# Patient Record
Sex: MALE | Employment: UNEMPLOYED | URBAN - METROPOLITAN AREA
[De-identification: names, ages, dates, MRNs, and addresses within clinical notes are randomized per-mention and may not be internally consistent; named-entity substitution may affect disease eponyms.]

---

## 2021-12-13 ENCOUNTER — HOSPITAL ENCOUNTER (OUTPATIENT)
Age: 46
Discharge: HOME | End: 2021-12-13
Payer: COMMERCIAL

## 2021-12-13 VITALS
HEART RATE: 75 BPM | TEMPERATURE: 98 F | BODY MASS INDEX: 45.1 KG/M2 | SYSTOLIC BLOOD PRESSURE: 143 MMHG | HEIGHT: 70 IN | WEIGHT: 315 LBS | OXYGEN SATURATION: 96 % | DIASTOLIC BLOOD PRESSURE: 84 MMHG | RESPIRATION RATE: 22 BRPM

## 2021-12-13 DIAGNOSIS — U07.1 COVID-19: Primary | ICD-10-CM

## 2021-12-13 PROCEDURE — 99204 OFFICE O/P NEW MOD 45 MIN: CPT

## 2021-12-13 PROCEDURE — 99203 OFFICE O/P NEW LOW 30 MIN: CPT

## 2021-12-13 RX ORDER — BUPRENORPHINE AND NALOXONE 8; 2 MG/1; MG/1
1 FILM, SOLUBLE BUCCAL; SUBLINGUAL 2 TIMES DAILY
COMMUNITY

## 2021-12-13 RX ORDER — ACETAMINOPHEN 500 MG
1000 TABLET ORAL ONCE
Status: COMPLETED | OUTPATIENT
Start: 2021-12-13 | End: 2021-12-13

## 2021-12-13 NOTE — ED INITIAL ASSESSMENT (HPI)
Pt exposed to covid positive people at a  1+ weeks ago. Co cough, sinus congestion, HA, fever and fatigue approx 1 week. Noted covid positive today.   Pt co SOB at rest and more with exertion

## 2021-12-13 NOTE — ED PROVIDER NOTES
Patient Seen in: [facility]      History   Patient presents with:  Difficulty Breathing    Stated Complaint: shortness breathe difficult breathing     Subjective:   HPI    80-year-old male who comes in today after testing positive for COV Regular rate and rhythm, no murmurs      ED Course   Labs Reviewed - No data to display                Reviewed patient's positive test from 12/13/2021    MDM      31-year-old male who is fully vaccinated with obesity comes in today for clinical antibodies the results and when to return if worse. The patient/caregiver verbalized understanding of the instructions.            Medications Prescribed:  Current Discharge Medication List

## 2021-12-14 NOTE — ED QUICK NOTES
Patient arrived for monoclonal antibody infusion. PIV started and Regeneron administered according to protocol. Call light within reach. VS rechecked per protocol prior to infusion and upon completion.   Patient tolerated infusion well, no adverse reactio

## 2023-02-10 ENCOUNTER — OFFICE VISIT (OUTPATIENT)
Dept: INTERNAL MEDICINE CLINIC | Facility: CLINIC | Age: 48
End: 2023-02-10
Payer: COMMERCIAL

## 2023-02-10 VITALS
WEIGHT: 315 LBS | HEART RATE: 68 BPM | SYSTOLIC BLOOD PRESSURE: 114 MMHG | DIASTOLIC BLOOD PRESSURE: 82 MMHG | BODY MASS INDEX: 50.03 KG/M2 | HEIGHT: 66.5 IN | OXYGEN SATURATION: 97 %

## 2023-02-10 DIAGNOSIS — Z51.81 ENCOUNTER FOR THERAPEUTIC DRUG LEVEL MONITORING: Primary | ICD-10-CM

## 2023-02-10 DIAGNOSIS — E66.01 CLASS 3 SEVERE OBESITY WITH BODY MASS INDEX (BMI) OF 50.0 TO 59.9 IN ADULT, UNSPECIFIED OBESITY TYPE, UNSPECIFIED WHETHER SERIOUS COMORBIDITY PRESENT (HCC): ICD-10-CM

## 2023-02-10 DIAGNOSIS — E78.2 MIXED HYPERLIPIDEMIA: ICD-10-CM

## 2023-02-10 DIAGNOSIS — Z98.84 S/P BARIATRIC SURGERY: ICD-10-CM

## 2023-02-10 DIAGNOSIS — E55.9 VITAMIN D DEFICIENCY: ICD-10-CM

## 2023-02-10 RX ORDER — MELATONIN
325
COMMUNITY

## 2023-02-10 RX ORDER — CHOLECALCIFEROL (VITAMIN D3) 1250 MCG
CAPSULE ORAL
COMMUNITY

## 2023-02-13 ENCOUNTER — TELEPHONE (OUTPATIENT)
Dept: INTERNAL MEDICINE CLINIC | Facility: CLINIC | Age: 48
End: 2023-02-13

## 2023-02-13 NOTE — TELEPHONE ENCOUNTER
[facility]   [id number]    S/P bariatric surgery  Z98.84    Class 3 severe obesity with body mass index (BMI) of 50.0 to 59.9 in adult, unspecified obesity type, unspecified whether serious comorbidity present ([facility]  E66.01, Z68.43

## 2023-02-21 NOTE — TELEPHONE ENCOUNTER
I called [facility] to check on status of PA  Spoke with Coty Ohm is a plan denial as it is not covered under pharmacy benefits. How do you want to proceed?

## 2023-02-22 RX ORDER — SEMAGLUTIDE 1.34 MG/ML
0.5 INJECTION, SOLUTION SUBCUTANEOUS
Qty: 1.5 ML | Refills: 0 | Status: SHIPPED | OUTPATIENT
Start: 2023-02-22

## 2023-08-01 NOTE — TELEPHONE ENCOUNTER
Pt called today and set up an appt with Jean Pierre Hernandez for her first avaiable appt on 10/26/23 [facility] Pt req a refill up until his future appt date.

## 2023-08-03 NOTE — TELEPHONE ENCOUNTER
Requesting   Requested Prescriptions     Pending Prescriptions Disp Refills    semaglutide (OZEMPIC, 0.25 OR 0.5 MG/DOSE,) 2 MG/3ML Subcutaneous Solution Pen-injector 3 mL 1     Sig: Inject 0.5 mg into the skin once a week. LOV: 2/10/23  RTC:   Last Relevant Labs:   Filled: 2/22/23 #1. 5mL with 0 refills    Future Appointments   Date Time Provider Royer Ibarra   10/26/2023 10:40 AM Sid Arambula PA-C [address] [facility] [address]

## 2023-08-06 RX ORDER — SEMAGLUTIDE 0.68 MG/ML
0.5 INJECTION, SOLUTION SUBCUTANEOUS WEEKLY
Qty: 3 ML | Refills: 1 | Status: SHIPPED | OUTPATIENT
Start: 2023-08-06

## 2023-09-05 ENCOUNTER — TELEPHONE (OUTPATIENT)
Dept: ORTHOPEDICS CLINIC | Facility: CLINIC | Age: 48
End: 2023-09-05

## 2023-09-05 DIAGNOSIS — Z01.89 ENCOUNTER FOR LOWER EXTREMITY COMPARISON IMAGING STUDY: Primary | ICD-10-CM

## 2023-09-05 DIAGNOSIS — M25.561 RIGHT KNEE PAIN, UNSPECIFIED CHRONICITY: ICD-10-CM

## 2023-09-05 NOTE — TELEPHONE ENCOUNTER
Future Appointments   Date Time Provider Royer Ibarra   9/20/2023  8:00 AM Bradley Gill MD [id number]       This patient is coming for RT Knee severe pain. No prior imaging done yet. Please advise if views are needed for this appt. Thanks.       Patient may be reached at [phone]

## 2023-09-20 ENCOUNTER — HOSPITAL ENCOUNTER (OUTPATIENT)
Dept: GENERAL RADIOLOGY | Age: 48
Discharge: HOME | End: 2023-09-20
Attending: ORTHOPAEDIC SURGERY
Payer: COMMERCIAL

## 2023-09-20 DIAGNOSIS — Z01.89 ENCOUNTER FOR LOWER EXTREMITY COMPARISON IMAGING STUDY: ICD-10-CM

## 2023-09-20 DIAGNOSIS — M25.561 RIGHT KNEE PAIN, UNSPECIFIED CHRONICITY: ICD-10-CM

## 2023-09-26 ENCOUNTER — HOSPITAL ENCOUNTER (OUTPATIENT)
Dept: GENERAL RADIOLOGY | Age: 48
Discharge: HOME | End: 2023-09-26
Attending: ORTHOPAEDIC SURGERY
Payer: COMMERCIAL

## 2023-09-26 PROCEDURE — 73562 X-RAY EXAM OF KNEE 3: CPT | Performed by: ORTHOPAEDIC SURGERY

## 2023-09-26 PROCEDURE — 73564 X-RAY EXAM KNEE 4 OR MORE: CPT | Performed by: ORTHOPAEDIC SURGERY

## 2023-09-29 ENCOUNTER — OFFICE VISIT (OUTPATIENT)
Dept: ORTHOPEDICS CLINIC | Facility: CLINIC | Age: 48
End: 2023-09-29
Payer: COMMERCIAL

## 2023-09-29 VITALS — BODY MASS INDEX: 46.65 KG/M2 | HEIGHT: 69 IN | WEIGHT: 315 LBS

## 2023-09-29 DIAGNOSIS — S83.206A POSITIVE MCMURRAY TEST OF RIGHT KNEE, INITIAL ENCOUNTER: Primary | ICD-10-CM

## 2023-09-29 DIAGNOSIS — S83.91XA SPRAIN OF RIGHT KNEE, UNSPECIFIED LIGAMENT, INITIAL ENCOUNTER: ICD-10-CM

## 2023-09-29 DIAGNOSIS — M25.561 RIGHT KNEE PAIN, UNSPECIFIED CHRONICITY: ICD-10-CM

## 2023-09-29 RX ORDER — KETOROLAC TROMETHAMINE 30 MG/ML
30 INJECTION, SOLUTION INTRAMUSCULAR; INTRAVENOUS ONCE
Status: COMPLETED | OUTPATIENT
Start: 2023-09-29 | End: 2023-09-29

## 2023-09-29 RX ORDER — TRIAMCINOLONE ACETONIDE 40 MG/ML
40 INJECTION, SUSPENSION INTRA-ARTICULAR; INTRAMUSCULAR ONCE
Status: COMPLETED | OUTPATIENT
Start: 2023-09-29 | End: 2023-09-29

## 2023-09-29 RX ADMIN — KETOROLAC TROMETHAMINE 30 MG: 30 INJECTION, SOLUTION INTRAMUSCULAR; INTRAVENOUS at 08:06:00

## 2023-09-29 RX ADMIN — TRIAMCINOLONE ACETONIDE 40 MG: 40 INJECTION, SUSPENSION INTRA-ARTICULAR; INTRAMUSCULAR at 08:05:00

## 2023-09-29 NOTE — PROCEDURES
Right Knee Intra-articular Injection    Name: Clyde Henson   MRN: [id number]  Date: 9/29/2023     Clinical Indications:   Persistent knee pain refractory to conservative measures. After informed consent, the injection site was marked, sterilized with topical chlorhexidine antiseptic, and locally anesthetized with skin refrigerant. The patient was situation in a comfortable position. Using sterile technique: 1 mL of 30mg/mL of Ketorolac, 2 mL of 0.5% Bupivicaine, 2 mL of 1% Lidocaine, and 1 mL of 40 mg/ml Triamcinolone was injected utilizing anterolateral approach with a 22 gauge needle. A band-aid was applied. The patient tolerated the procedure well. Lluvia Gonzales. Lynn Redman MD  Knee, Shoulder, & Elbow Surgery / Sports Medicine Specialist  THE [facility] Orthopaedic Surgery  [address], [address]   Marium Cummings. [email or web address]  t: [phone]  o: [phone]  f: [phone]

## 2023-11-20 ENCOUNTER — OFFICE VISIT (OUTPATIENT)
Dept: ORTHOPEDICS CLINIC | Facility: CLINIC | Age: 48
End: 2023-11-20
Payer: COMMERCIAL

## 2023-11-20 DIAGNOSIS — S83.206A POSITIVE MCMURRAY TEST OF RIGHT KNEE, INITIAL ENCOUNTER: Primary | ICD-10-CM

## 2023-11-20 DIAGNOSIS — S83.207A POSITIVE MCMURRAY TEST OF LEFT KNEE, INITIAL ENCOUNTER: ICD-10-CM

## 2023-11-20 PROCEDURE — 20610 DRAIN/INJ JOINT/BURSA W/O US: CPT | Performed by: ORTHOPAEDIC SURGERY

## 2023-11-20 PROCEDURE — 99214 OFFICE O/P EST MOD 30 MIN: CPT | Performed by: ORTHOPAEDIC SURGERY

## 2023-11-20 RX ORDER — KETOROLAC TROMETHAMINE 30 MG/ML
30 INJECTION, SOLUTION INTRAMUSCULAR; INTRAVENOUS ONCE
Status: COMPLETED | OUTPATIENT
Start: 2023-11-20 | End: 2023-11-20

## 2023-11-20 RX ORDER — TRIAMCINOLONE ACETONIDE 40 MG/ML
40 INJECTION, SUSPENSION INTRA-ARTICULAR; INTRAMUSCULAR ONCE
Status: COMPLETED | OUTPATIENT
Start: 2023-11-20 | End: 2023-11-20

## 2023-11-20 RX ADMIN — KETOROLAC TROMETHAMINE 30 MG: 30 INJECTION, SOLUTION INTRAMUSCULAR; INTRAVENOUS at 09:47:00

## 2023-11-20 RX ADMIN — TRIAMCINOLONE ACETONIDE 40 MG: 40 INJECTION, SUSPENSION INTRA-ARTICULAR; INTRAMUSCULAR at 09:47:00

## 2023-11-20 NOTE — PROCEDURES
Right Knee Intra-articular Injection    Name: Annetta Sahu   MRN: [id number]  Date: 11/20/2023     Clinical Indications:   Persistent knee pain refractory to conservative measures. After informed consent, the injection site was marked, sterilized with topical chlorhexidine antiseptic, and locally anesthetized with skin refrigerant. The patient was situation in a comfortable position. Using sterile technique: 1 mL of 30mg/mL of Ketorolac, 2 mL of 0.5% Bupivicaine, 2 mL of 1% Lidocaine, and 1 mL of 40 mg/ml Triamcinolone was injected utilizing anterolateral approach with a 22 gauge needle. A band-aid was applied. The patient tolerated the procedure well. Arron Gillis. Kristi Woods MD  Knee, Shoulder, & Elbow Surgery / Sports Medicine Specialist  THE [facility] Orthopaedic Surgery  [address], [address] [place] [address]. [email or web address]  t: [phone]  o: [phone]  f: [phone]

## 2023-12-06 ENCOUNTER — OFFICE VISIT (OUTPATIENT)
Dept: ORTHOPEDICS CLINIC | Facility: CLINIC | Age: 48
End: 2023-12-06
Payer: COMMERCIAL

## 2023-12-06 DIAGNOSIS — S83.207A POSITIVE MCMURRAY TEST OF LEFT KNEE, INITIAL ENCOUNTER: ICD-10-CM

## 2023-12-06 DIAGNOSIS — S83.231A COMPLEX TEAR OF MEDIAL MENISCUS OF RIGHT KNEE AS CURRENT INJURY, INITIAL ENCOUNTER: Primary | ICD-10-CM

## 2023-12-06 DIAGNOSIS — M17.11 PRIMARY OSTEOARTHRITIS OF RIGHT KNEE: ICD-10-CM

## 2023-12-06 RX ORDER — KETOROLAC TROMETHAMINE 30 MG/ML
30 INJECTION, SOLUTION INTRAMUSCULAR; INTRAVENOUS ONCE
Status: COMPLETED | OUTPATIENT
Start: 2023-12-06 | End: 2023-12-06

## 2023-12-06 RX ORDER — TRIAMCINOLONE ACETONIDE 40 MG/ML
40 INJECTION, SUSPENSION INTRA-ARTICULAR; INTRAMUSCULAR ONCE
Status: COMPLETED | OUTPATIENT
Start: 2023-12-06 | End: 2023-12-06

## 2023-12-06 RX ADMIN — KETOROLAC TROMETHAMINE 30 MG: 30 INJECTION, SOLUTION INTRAMUSCULAR; INTRAVENOUS at 11:15:00

## 2023-12-06 RX ADMIN — TRIAMCINOLONE ACETONIDE 40 MG: 40 INJECTION, SUSPENSION INTRA-ARTICULAR; INTRAMUSCULAR at 11:15:00

## 2023-12-06 NOTE — PROCEDURES
Left Knee Intra-articular Injection    Name: Allison Lemus   MRN: [id number]  Date: 12/6/2023     Clinical Indications:   Persistent knee pain refractory to conservative measures. After informed consent, the injection site was marked, sterilized with topical chlorhexidine antiseptic, and locally anesthetized with skin refrigerant. The patient was situation in a comfortable position. Using sterile technique: 1 mL of 30mg/mL of Ketorolac, 2 mL of 0.5% Bupivicaine, 2 mL of 1% Lidocaine, and 1 mL of 40 mg/ml Triamcinolone was injected utilizing anterolateral approach with a 22 gauge needle. A band-aid was applied. The patient tolerated the procedure well. Eileen Mayorga. Patrick Roque MD  Knee, Shoulder, & Elbow Surgery / Sports Medicine Specialist  THE [facility] Orthopaedic Surgery  [address]   Chayo Mendoza. Owen Gonzales. [email or web address]  t: [phone]  o: [phone]  f: [phone]

## 2023-12-11 ENCOUNTER — OFFICE VISIT (OUTPATIENT)
Dept: INTERNAL MEDICINE CLINIC | Facility: CLINIC | Age: 48
End: 2023-12-11
Payer: COMMERCIAL

## 2023-12-11 VITALS
HEIGHT: 66.5 IN | WEIGHT: 315 LBS | SYSTOLIC BLOOD PRESSURE: 136 MMHG | RESPIRATION RATE: 16 BRPM | DIASTOLIC BLOOD PRESSURE: 88 MMHG | HEART RATE: 88 BPM | BODY MASS INDEX: 50.03 KG/M2

## 2023-12-11 DIAGNOSIS — E66.01 CLASS 3 SEVERE OBESITY WITH BODY MASS INDEX (BMI) OF 50.0 TO 59.9 IN ADULT, UNSPECIFIED OBESITY TYPE, UNSPECIFIED WHETHER SERIOUS COMORBIDITY PRESENT (HCC): ICD-10-CM

## 2023-12-11 DIAGNOSIS — Z51.81 ENCOUNTER FOR THERAPEUTIC DRUG LEVEL MONITORING: Primary | ICD-10-CM

## 2023-12-11 DIAGNOSIS — E55.9 VITAMIN D DEFICIENCY: ICD-10-CM

## 2023-12-11 DIAGNOSIS — R63.5 WEIGHT GAIN: ICD-10-CM

## 2023-12-11 DIAGNOSIS — E78.2 MIXED HYPERLIPIDEMIA: ICD-10-CM

## 2023-12-11 DIAGNOSIS — Z98.84 S/P BARIATRIC SURGERY: ICD-10-CM

## 2023-12-11 RX ORDER — SEMAGLUTIDE 0.68 MG/ML
INJECTION, SOLUTION SUBCUTANEOUS
Qty: 9 ML | Refills: 0 | Status: SHIPPED | OUTPATIENT
Start: 2023-12-11

## 2023-12-12 ENCOUNTER — TELEPHONE (OUTPATIENT)
Dept: INTERNAL MEDICINE CLINIC | Facility: CLINIC | Age: 48
End: 2023-12-12

## 2023-12-12 NOTE — TELEPHONE ENCOUNTER
Efren sent request to do PA for Ozempic 0.25 mg  Patient is not diabetic - please advise      [facility] [phone]

## 2023-12-15 ENCOUNTER — OFFICE VISIT (OUTPATIENT)
Dept: ORTHOPEDICS CLINIC | Facility: CLINIC | Age: 48
End: 2023-12-15
Payer: COMMERCIAL

## 2023-12-15 VITALS — BODY MASS INDEX: 47.74 KG/M2 | HEIGHT: 68 IN | WEIGHT: 315 LBS

## 2023-12-15 DIAGNOSIS — S83.231A COMPLEX TEAR OF MEDIAL MENISCUS OF RIGHT KNEE AS CURRENT INJURY, INITIAL ENCOUNTER: ICD-10-CM

## 2023-12-15 DIAGNOSIS — E66.01 MORBID OBESITY (HCC): ICD-10-CM

## 2023-12-15 DIAGNOSIS — G89.29 CHRONIC PAIN OF RIGHT KNEE: Primary | ICD-10-CM

## 2023-12-15 DIAGNOSIS — M25.561 CHRONIC PAIN OF RIGHT KNEE: Primary | ICD-10-CM

## 2023-12-15 NOTE — H&P
[facility] New Patient Note    CC:   Chief Complaint   Patient presents with    Other     Right knee surgical discussion        HPI: This 50year old male presents today with complaints of right knee pain, wanting to discuss replacement. He has been seen by Dr. Lindsay Johns for the past few months, reports increasing pain in the knee following increase in activity with hiking. He states that the knees did not bother him in the past.  He initially was seen for pain in the right knee, but states that he has symptoms in his left knee as well now. He points to the front of the right knee when asked where the pain occurs. There is no radiation. It is worse with weightbearing. He has done injections, last left knee injection was 1 week ago and right knee injection was 1 month ago. He did recently start with [facility]. History reviewed. No pertinent past medical history. History reviewed. No pertinent surgical history. Current Outpatient Medications   Medication Sig Dispense Refill    semaglutide (OZEMPIC, 0.25 OR 0.5 MG/DOSE,) 2 MG/1.5ML Subcutaneous Solution Pen-injector Inject 0.5 mg into the skin every 7 days. 1.5 mL 0    ferrous sulfate 325 (65 FE) MG Oral Tab EC Take 1 tablet (325 mg total) by mouth daily with breakfast.      Cholecalciferol (VITAMIN D3) 1.25 MG (97429 UT) Oral Cap Take by mouth.      buprenorphine-naloxone 8-2 MG Sublingual Film Place 1 Film under the tongue in the morning and 1 Film before bedtime. semaglutide (OZEMPIC, 0.25 OR 0.5 MG/DOSE,) 2 MG/3ML Subcutaneous Solution Pen-injector Begin with 0.25mg into skin weekly x 4 weeks, and then increase to 0.5mg into skin weekly 9 mL 0    semaglutide (OZEMPIC, 0.25 OR 0.5 MG/DOSE,) 2 MG/3ML Subcutaneous Solution Pen-injector Inject 0.5 mg into the skin once a week. (Patient not taking: Reported on 12/11/2023) 3 mL 1     No Known Allergies  History reviewed. No pertinent family history.   Social History Occupational History    Not on file   Tobacco Use    Smoking status: Never    Smokeless tobacco: Never   Substance and Sexual Activity    Alcohol use: Not on file    Drug use: Not on file    Sexual activity: Not on file        ROS:  Detailed system review obtained and negative except as mentioned above      Physical Exam:    There were no vitals taken for this visit. Constitutional: Awake, alert, no distress. Psychological: Appropriate affect. Respiratory: Unlabored breathing. Right lower extremity:  Inspection: skin with superficial excoriations medially and varicose veins  Palpation: Tender to palpation about the medial and patellofemoral joint  Range of motion: Extension of the knee about 5 degrees short of full, flexion to around 90  Neuromuscular: 5 out of 5 quad strength  Vascular: Well-perfused      Imaging: Weightbearing x-rays of the right knee from 2 months ago personally viewed, independently interpreted and radiology report read. Demonstrates well-maintained joint space, minimal to no degenerative changes, joint space narrowing or osteophyte formation. Patellofemoral joint demonstrates possible osteophytic lipping laterally. MRI of the right knee from 1 week ago personally viewed, independently interpreted and radiology report read. Demonstrates complex tearing of the medial meniscus with subchondral edema within the medial femoral condyle medial tibial plateau. Assessment/Plan:  Diagnoses and all orders for this visit:    Chronic pain of right knee    Complex tear of medial meniscus of right knee as current injury, initial encounter    BMI 50.0-59.9, adult ([address]    Morbid obesity ([address]      Assessment: 55-year-old male with subacute right knee pain    Plan: We discussed potential etiologies, treatment options and considerations.   Counseled the patient that based on recent weightbearing x-rays, arthroplasty would not be a recommended surgical treatment at this point, as surgical outcomes correlate with findings of weightbearing x-rays and not with advanced imaging findings. Did discuss the possibility of progression of arthritis down the road for which knee replacement could be indicated, however counseled that from a surgical standpoint, he would currently be at a significantly higher risk of complications given body mass index over 50, and in particular for partial knee replacement which may or may not be recommended given BMI as well as nonfocal location of pain. At this point, his pain is subacute in nature, beginning only a few months ago with a significant increase in physical activity. It is very possible that symptom exacerbation may be overuse/overload of the joint given elevated body mass index in combination with significant increase in activity, and continuing with weight loss/[facility] will be his best option for improving his risk profile as well as decreasing his knee pain symptoms more so than any surgical intervention at this point. Could consider continued anti-inflammatory treatments/injections as needed in the interim for symptom relief. He had no further questions and can follow-up as needed.     Tru Rangel MD, [address] Orthopedic Surgery  Phone [phone]  Fax [phone]

## 2023-12-17 NOTE — TELEPHONE ENCOUNTER
Requesting   Requested Prescriptions     Pending Prescriptions Disp Refills    semaglutide (OZEMPIC, 0.25 OR 0.5 MG/DOSE,) 2 MG/1.5ML Subcutaneous Solution Pen-injector 1.5 mL 0     Sig: Inject 0.5 mg into the skin every 7 days. LOV: 12/11/23  RTC:   Last Relevant Labs:   Filled: 12/11/23 #9ml with 0 refills (different pharmacy)     No future appointments.

## 2023-12-18 RX ORDER — SEMAGLUTIDE 1.34 MG/ML
0.5 INJECTION, SOLUTION SUBCUTANEOUS
Qty: 1.5 ML | Refills: 0 | Status: SHIPPED | OUTPATIENT
Start: 2023-12-18

## 2023-12-19 NOTE — TELEPHONE ENCOUNTER
Requesting   Requested Prescriptions     Pending Prescriptions Disp Refills    semaglutide (OZEMPIC, 0.25 OR 0.5 MG/DOSE,) 2 MG/1.5ML Subcutaneous Solution Pen-injector 1.5 mL 0     Sig: Inject 0.5 mg into the skin every 7 days.       LOV: 12/11/23  RTC:   Last Relevant Labs:   Filled: 12/22/23 #1.5 with 0 refills    No future appointments.

## 2023-12-20 RX ORDER — SEMAGLUTIDE 1.34 MG/ML
0.5 INJECTION, SOLUTION SUBCUTANEOUS
Qty: 1.5 ML | Refills: 0 | Status: SHIPPED | OUTPATIENT
Start: 2023-12-20

## 2024-01-01 RX ORDER — TIRZEPATIDE 2.5 MG/.5ML
2.5 INJECTION, SOLUTION SUBCUTANEOUS WEEKLY
Qty: 2 ML | Refills: 0 | Status: SHIPPED | OUTPATIENT
Start: 2024-01-01

## 2024-01-17 ENCOUNTER — TELEPHONE (OUTPATIENT)
Dept: PHYSICAL THERAPY | Facility: HOSPITAL | Age: 49
End: 2024-01-17

## 2024-09-11 ENCOUNTER — OFFICE VISIT (OUTPATIENT)
Dept: FAMILY MEDICINE CLINIC | Facility: CLINIC | Age: 49
End: 2024-09-11
Payer: COMMERCIAL

## 2024-09-11 ENCOUNTER — TELEPHONE (OUTPATIENT)
Dept: FAMILY MEDICINE CLINIC | Facility: CLINIC | Age: 49
End: 2024-09-11

## 2024-09-11 VITALS
BODY MASS INDEX: 47.74 KG/M2 | OXYGEN SATURATION: 99 % | SYSTOLIC BLOOD PRESSURE: 130 MMHG | TEMPERATURE: 97 F | DIASTOLIC BLOOD PRESSURE: 78 MMHG | RESPIRATION RATE: 18 BRPM | HEIGHT: 68 IN | WEIGHT: 315 LBS | HEART RATE: 64 BPM

## 2024-09-11 DIAGNOSIS — Z00.00 WELLNESS EXAMINATION: Primary | ICD-10-CM

## 2024-09-11 DIAGNOSIS — Z12.11 SCREENING FOR MALIGNANT NEOPLASM OF COLON: ICD-10-CM

## 2024-09-11 DIAGNOSIS — I10 HYPERTENSION, UNSPECIFIED TYPE: ICD-10-CM

## 2024-09-11 DIAGNOSIS — Z12.5 PROSTATE CANCER SCREENING: ICD-10-CM

## 2024-09-11 DIAGNOSIS — E66.01 CLASS 3 SEVERE OBESITY WITH SERIOUS COMORBIDITY AND BODY MASS INDEX (BMI) OF 45.0 TO 49.9 IN ADULT, UNSPECIFIED OBESITY TYPE (HCC): ICD-10-CM

## 2024-09-11 DIAGNOSIS — Z00.00 LABORATORY EXAMINATION ORDERED AS PART OF A ROUTINE GENERAL MEDICAL EXAMINATION: ICD-10-CM

## 2024-09-11 PROBLEM — E66.813 CLASS 3 SEVERE OBESITY WITH SERIOUS COMORBIDITY AND BODY MASS INDEX (BMI) OF 45.0 TO 49.9 IN ADULT: Status: ACTIVE | Noted: 2024-09-11

## 2024-09-11 PROBLEM — E66.813 CLASS 3 SEVERE OBESITY WITH SERIOUS COMORBIDITY AND BODY MASS INDEX (BMI) OF 45.0 TO 49.9 IN ADULT (HCC): Status: ACTIVE | Noted: 2024-09-11

## 2024-09-11 PROCEDURE — 3075F SYST BP GE 130 - 139MM HG: CPT | Performed by: FAMILY MEDICINE

## 2024-09-11 PROCEDURE — 3008F BODY MASS INDEX DOCD: CPT | Performed by: FAMILY MEDICINE

## 2024-09-11 PROCEDURE — 99386 PREV VISIT NEW AGE 40-64: CPT | Performed by: FAMILY MEDICINE

## 2024-09-11 PROCEDURE — 3078F DIAST BP <80 MM HG: CPT | Performed by: FAMILY MEDICINE

## 2024-09-11 RX ORDER — LISINOPRIL 10 MG/1
10 TABLET ORAL DAILY
COMMUNITY
Start: 2024-04-25 | End: 2024-09-11

## 2024-09-11 RX ORDER — LISINOPRIL 10 MG/1
10 TABLET ORAL DAILY
Qty: 90 TABLET | Refills: 1 | Status: SHIPPED | OUTPATIENT
Start: 2024-09-11

## 2024-09-11 RX ORDER — TIRZEPATIDE 2.5 MG/.5ML
2.5 INJECTION, SOLUTION SUBCUTANEOUS WEEKLY
Qty: 2 ML | Refills: 0 | Status: SHIPPED | OUTPATIENT
Start: 2024-09-11 | End: 2024-10-03

## 2024-09-11 NOTE — TELEPHONE ENCOUNTER
Prior Authorization History  Tirzepatide-Weight Management (ZEPBOUND) 2.5 MG/0.5ML Subcutaneous Solution Auto-injector     Approval Details    Authorization number: [id number]  Authorized from September 11, 2024 to March 11, 2025  Information received electronically from payer     History    View all authorizations for this medication  Approved   9/11/2024  8:27 AM  Appeal supported: No   Note from payer: Request Reference Number: [id number].  ZEPBOUND     INJ 2.5MG is approved through 03/11/2025.  Your patient may now fill this prescription and it will be covered.   Payer: [employer] Case ID: [id number]  Waiting for Payer Response   9/11/2024  8:26 AM  Deadline to reply: September 18, 2024  7:30 AM Sending user: Gayathri Skinner   Payer: [employer] Case ID: [id number]    [phone]  Prior Authorization Request Evaluation Questions   This request expires on 09/18/2024 07:29 AM CST. Please provide all information requested. Failure to complete this form in its entirety may result in delayed processing or an adverse determination for insufficient information.  Waiting for Payer Response   9/11/2024  7:28 AM  Sending user: Jimmy Azul APRN   Payer: [employer]

## 2024-09-11 NOTE — PROGRESS NOTES
CHIEF COMPLAINT:     Chief Complaint   Patient presents with    Establish Care     Patient here to establish care with new PCP.    Weight Loss     Patient here to discuss weight loss options.       HPI:   Pardeep Sims is a 48 year old male who presents for a complete physical exam.     Patient has concerns of obesity. Patient states that he is struggling with weight loss. He walks regularly for exercise. He also monitors his diet very closely in an attempt to loose weight. Patient states that he has seen the weight loss clinic in the past and attempted to use medication to augment weight loss but it was not approved by insurance. Patient states that he is also in need of a new PCP and would like to establish care here. Patient states that he has had a 10 + year francis with addiction. 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     No visits with results within 3 Month(s) from this visit.   Latest known visit with results is:   No results found for any previous visit.        Imms-    Immunization History   Administered Date(s) Administered    Covid-19 Vaccine Moderna 100 mcg/0.5 ml 02/17/2021, 03/17/2021    Pfizer Covid-19 Vaccine 30mcg/0.3ml 12yrs+ 06/23/2024    TDAP 04/04/2007, 06/23/2024        Prostate cancer screening- ARIANA declined no urinary symptoms, psa test ordered.     Colon cancer screening- Ordered today.    Dental/Eye Check up-  Recommended pt see dentist once every 6 months for a cleaning and once every year for an eye exam.       Current Outpatient Medications   Medication Sig Dispense Refill    Tirzepatide-Weight Management (ZEPBOUND) 2.5 MG/0.5ML Subcutaneous Solution Auto-injector Inject 2.5 mg into the skin once a week for 4 doses. 2 mL 0    lisinopril 10 MG Oral Tab Take 1 tablet (10 mg total) by mouth daily. 90 tablet 1    ferrous sulfate 325 (65 FE) MG Oral Tab EC Take 1 tablet (325 mg total) by mouth daily with  breakfast.      Cholecalciferol (VITAMIN D3) 1.25 MG (70212 UT) Oral Cap Take by mouth.      buprenorphine-naloxone 8-2 MG Sublingual Film Place 1 Film under the tongue in the morning and 1 Film before bedtime.        History reviewed. No pertinent past medical history.   History reviewed. No pertinent surgical history.   History reviewed. No pertinent family history.   Social History:  Social History     Socioeconomic History    Marital status:    Tobacco Use    Smoking status: Never    Smokeless tobacco: Never   Vaping Use    Vaping status: Never Used   Substance and Sexual Activity    Alcohol use: Never    Drug use: Never      Exercise: walking.  Diet: watches sugar closely     REVIEW OF SYSTEMS:   GENERAL: Feels well otherwise, would like to lose weight for health and wellness reasons.   SKIN: denies any unusual skin lesions  EYES:denies blurred vision or double vision  HEENT: denies nasal congestion, sinus pain or ST  LUNGS: denies shortness of breath at rest on on exertion  CARDIOVASCULAR: denies chest pain or palpitations   GI: denies abdominal pain or heartburn.  No N/V/C/D.  : denies nocturia or changes in stream  MUSCULOSKELETAL: denies back pain or other joint pain  NEURO: denies headaches  PSYCHE: denies depression or anxiety  HEMATOLOGIC: denies hx of anemia or other bleeding disorders  ENDOCRINE: denies thyroid history  ALLERGY/ASTHMA: denies hx of seasonal allergies or asthma    EXAM:   /78 (BP Location: Left arm, Patient Position: Sitting, Cuff Size: large)   Pulse 64   Temp 97.1 °F (36.2 °C) (Temporal)   Resp 18   Ht 5' 8\" (1.727 m)   Wt (!) 321 lb 3.2 oz (145.7 kg)   SpO2 99%   BMI 48.84 kg/m²   Body mass index is 48.84 kg/m².     GENERAL: well developed, well nourished,in no apparent distress  SKIN: no rashes,no suspicious lesions  HEENT: atraumatic, normocephalic,ears and throat are clear  EYES:PERRLA, EOMI, normal optic disk,conjunctiva are clear  NECK: supple,no  adenopathy,no bruits  CHEST: no chest tenderness  LUNGS: Clear to auscultation bilaterally. No diminished breath sounds. No wheezing, rhonchi, or rales.  CARDIO: RRR without murmur  GI: BS's present x4., No tenderness of palpation.  No obvious masses or palpable organomegaly   MUSCULOSKELETAL: back is not tender, ROM of the back fully intact  EXTREMITIES: no cyanosis, clubbing or edema  NEURO: Alert & Oriented x3. Cranial nerves are grossly intact.     ASSESSMENT AND PLAN:   Pardeep Sims is a 48 year old male who presents for an annual physical exam.     ASSESSMENT & PLAN:    1. Wellness examination  Discussion about general health and wellness screening.   Patient is agreeable to getting lab work done to evaluate and monitor as part of screening and prevention of health issues.  Discussion about general diet and increasing activity.  Discussion about taking daily multivitamin.   Will discuss any abnormal values if they arise.    2. Laboratory examination ordered as part of a routine general medical examination  - CBC [6399] [Q]  - COMP METABOLIC PANEL [62875] [Q]  - TSH W REFLEX TO FREE T4 [39732][Q]  - LIPID PANEL [1200] [Q]    3. Screening for malignant neoplasm of colon  - GASTRO - INTERNAL    4. Hypertension, unspecified type  - Tirzepatide-Weight Management (ZEPBOUND) 2.5 MG/0.5ML Subcutaneous Solution Auto-injector; Inject 2.5 mg into the skin once a week for 4 doses.  Dispense: 2 mL; Refill: 0  - lisinopril 10 MG Oral Tab; Take 1 tablet (10 mg total) by mouth daily.  Dispense: 90 tablet; Refill: 1    5. Class 3 severe obesity with serious comorbidity and body mass index (BMI) of 45.0 to 49.9 in adult, unspecified obesity type (HCC)  - Tirzepatide-Weight Management (ZEPBOUND) 2.5 MG/0.5ML Subcutaneous Solution Auto-injector; Inject 2.5 mg into the skin once a week for 4 doses.  Dispense: 2 mL; Refill: 0    6. Prostate cancer screening  - PSA - Total [5363][Q]           Patient's questions/concerns were  addressed and answered. Patient is in agreement with treatment plan.     .

## 2024-10-23 ENCOUNTER — OFFICE VISIT (OUTPATIENT)
Dept: ORTHOPEDICS CLINIC | Facility: CLINIC | Age: 49
End: 2024-10-23
Payer: COMMERCIAL

## 2024-10-23 DIAGNOSIS — M17.11 PRIMARY OSTEOARTHRITIS OF RIGHT KNEE: ICD-10-CM

## 2024-10-23 DIAGNOSIS — S83.207A POSITIVE MCMURRAY TEST OF LEFT KNEE, INITIAL ENCOUNTER: ICD-10-CM

## 2024-10-23 DIAGNOSIS — S83.231A COMPLEX TEAR OF MEDIAL MENISCUS OF RIGHT KNEE AS CURRENT INJURY, INITIAL ENCOUNTER: Primary | ICD-10-CM

## 2024-10-23 RX ORDER — TRIAMCINOLONE ACETONIDE 40 MG/ML
40 INJECTION, SUSPENSION INTRA-ARTICULAR; INTRAMUSCULAR ONCE
Status: COMPLETED | OUTPATIENT
Start: 2024-10-23 | End: 2024-10-24

## 2024-10-23 RX ORDER — KETOROLAC TROMETHAMINE 30 MG/ML
30 INJECTION, SOLUTION INTRAMUSCULAR; INTRAVENOUS ONCE
Status: COMPLETED | OUTPATIENT
Start: 2024-10-23 | End: 2024-10-24

## 2024-10-23 NOTE — PROGRESS NOTES
Orthopaedic Surgery  [address] [place] [id number]  [phone]     Name: Pardeep Sims   MRN: [id number]  Date: 10/23/2024     REASON FOR VISIT: Follow up -  Left knee pain   Right knee medial meniscus tear in the setting of ostearthritis.    INTERVAL HISTORY:   Pardeep Sims is a very pleasant 49 year old male who returns today for repeat evaluation of bilateral knee pain.     To summarize: left knee pain onset July 2023 after a weighted backpack hike. Right knee pain also onset later in August 2023. Pain worsens with bending. This is accompanied by weakness. MRI of the right knee demonstrates a medial meniscus tear.     We have been managing conservatively with 2 right knee steroid injections on 9/29/23 + 11/20/23 and left knee steroid injection on 12/6/23.     Most recently, in the last few weeks both knees have flared up. Left knee is worse with 9/10 pain and right knee with 7/10 pain. Patient was also evaluated by a joint arthroplasty surgeon who advised against a knee replacement.      Patient lives in [place]. He works as a . Enjoys riding his motorcycle. Lives at home with his wife and 2 kids.    PE:   There were no vitals filed for this visit.  Estimated body mass index is 48.84 kg/m² as calculated from the following:    Height as of 9/11/24: 5' 8\" (1.727 m).    Weight as of 9/11/24: 321 lb 3.2 oz.    Physical Exam  Constitutional:       Appearance: Normal appearance.   HENT:      Head: Normocephalic and atraumatic.   Eyes:      Extraocular Movements: Extraocular movements intact.   Neck:      Musculoskeletal: Normal range of motion and neck supple.   Cardiovascular:      Pulses: Normal pulses.   Pulmonary:      Effort: Pulmonary effort is normal. No respiratory distress.   Abdominal:      General: There is no distension.   Skin:     General: Skin is warm.      Capillary Refill: Capillary refill takes less than 2 seconds.      Findings: No bruising.   Neurological:       General: No focal deficit present.      Mental Status: She is alert.   Psychiatric:         Mood and Affect: Mood normal.     Examination of the knees demonstrates:     Skin is intact, warm and dry.     Right - ROM 0-120 degrees. Medial joint line tenderness.    Left - ROM 0-90 degrees. Medial joint line tenderness.    Radiographic Examination/Diagnostics:  Knee MRI personally viewed, independently interpreted and radiology report was reviewed.     MRI KNEE, RIGHT ([id number]=[id number])     Result Date: 12/5/2023  Exam: MRI KNEE RT W/O CONTRAST CPT Code(s): 04601 - MRI JNT OF LWR EXTRE W/O DYE MRI RIGHT KNEE HISTORY: Unspecified tear of unspecified meniscus, current injury, right knee, initial encounter COMPARISON:  None currently available. TECHNIQUE: Routine MRI exam performed on a wide bore ultra high field 3.0 T Siemens Skyra MR scanner, without intravenous contrast. FINDINGS: Large joint effusion with multiple intra-articular bodies no popliteal cyst. Fluid extends through the semimembranosus/medial gastrocnemius bursa. Fluid signal extends in the myofascial planes along the superficial surface of the popliteal muscle. No fracture or worrisome bone lesion. Multi directional tearing and maceration of the body of the medial meniscus which is extruded from the joint space. The remainder of the medial meniscus appears intact. Lateral meniscus is intact. Distal quadriceps tendon, patellar tendon, anterior and posterior cruciate ligaments, lateral collateral ligament, distal IT band, popliteal tendon and biceps tendon intact. High-grade sprain and partial-thickness tearing of the medial collateral ligament. Medial tibial femoral joint space degenerative arthropathy characterized by marginal osteophyte formation, large zones of full-thickness cartilage thinning and subchondral reactive marrow edema on both sides of the joint. Lateral tibial femoral joint space and patellofemoral joint space degenerative arthropathy  characterized by very small marginal osteophytes and low-grade cartilage degeneration.     IMPRESSION:   1.Multidirectional tearing and maceration of the body of the medial meniscus which is extruded from the joint space.   2.Medial tibial femoral joint demonstrates severe cartilage degeneration with diffuse full-thickness cartilage thinning.   3.Large joint effusion with multiple intra-articular bodies.   4.High-grade sprain and partial-thickness tearing of the medial collateral ligament.   5.Fluid extending along the myofascial planes of the calf which may be secondary to rupture or leak of popliteal cyst.   Interpreting Radiologist: Carlos Beach M.D. Electronically Signed: 12/05/2023 09:45 AM    IMPRESSION: Pardeep Sims is a 49 year old male with right knee medial meniscus tear with osteoarthritis and suspected left knee mensicus tear sustained July 2023 after a weighted backpack hike. Temporary relief from 2 right knee steroid injections on 9/29/23 + 11/20/23 and left knee steroid injection on 12/6/23.     We elected to maximize conservative management with repeat bilateral intra-articular corticosteroid and ketorolac injections. An MRI of the left knee was also ordered for further evaluation of possible meniscus pathology.     PLAN:   We reviewed the treatment of this disease condition.  Fortunately, treatment is amenable to conservative treatment which we chose to optimize at today's visit.  After a discussion of a variety of conservative treatment options, I recommended intra-articular injection with corticosteroid and ketorolac.       We elected to proceed with the injection procedure at today's visit. We discussed the risk and benefits of the procedure; including, but not limited to: infection, injury to blood vessels, nerve injury, prolonged pain, swelling, site soreness, failure to progress, and need for advanced treatments.  The patient voiced understanding and agreed to proceed with the  treatment plan.      In light of the chronicity of symptoms, loss of normal function, and  failure to progress conservatively we recommend an MRI to evaluate the integrity of the patient's findings. The patient will follow up after imaging.   Differential diagnosis includes but not limited to: cartilage injury/loose body, meniscus tear/injury, ACL tear, bone marrow edema, and osteoarthritis.     External records were also reviewed for pertinent historical findings contributing to the patients undiagnosed new problem with uncertain prognosis.      The patient had the opportunity to ask questions, and all questions were answered appropriately.      FOLLOW-UP:   Return to clinic after MRI completion.         Ange Vasquez MD  Knee, Shoulder, & Elbow Surgery / Sports Medicine Specialist  Orthopaedic Surgery  [address] [place] [address] [id number] [place] [address].org  [email or web address]  t: [phone]  o: [phone]  f: [phone]    This note was dictated using Dragon software.  While it was briefly proofread prior to completion, some grammatical, spelling, and word choice errors due to dictation may still occur.

## 2024-10-24 RX ADMIN — TRIAMCINOLONE ACETONIDE 40 MG: 40 INJECTION, SUSPENSION INTRA-ARTICULAR; INTRAMUSCULAR at 12:18:00

## 2024-10-24 RX ADMIN — KETOROLAC TROMETHAMINE 30 MG: 30 INJECTION, SOLUTION INTRAMUSCULAR; INTRAVENOUS at 11:44:00

## 2024-10-25 NOTE — PROCEDURES
Bilateral Knee Intra-articular Injection    Name: Pardeep Sims   MRN: [id number]  Date: 10/23/2024     Clinical Indications:   Knee Osteoarthritis with symptoms refractory to conservative measures.     After informed consent, the injection site was marked, sterilized with topical chlorhexidine antiseptic, and locally anesthetized with skin refrigerant.    The patient was situation in a comfortable position. Using sterile technique: 0.5 mL of 30mg/mL of Ketorolac, 2 mL of 0.5% Bupivicaine, 2 mL of 1% Lidocaine, and 1 mL of 40 mg/ml Triamcinolone was injected utilizing anterolateral approach with a 22 gauge needle.  A band-aid was applied.  The patient tolerated the procedure well.        Ange Vasquez MD  Knee, Shoulder, & Elbow Surgery / Sports Medicine Specialist  Orthopaedic Surgery  [address] [place] [address] [id number] [place] [address].org  [email or web address]  t: [phone]  o: [phone]  f: [phone]

## 2024-11-05 ENCOUNTER — TELEPHONE (OUTPATIENT)
Dept: ORTHOPEDICS CLINIC | Facility: CLINIC | Age: 49
End: 2024-11-05

## 2024-11-05 NOTE — TELEPHONE ENCOUNTER
Did not see MRI on chart     Called patient and left message requesting a call back with status of MRI

## 2024-11-08 ENCOUNTER — TELEPHONE (OUTPATIENT)
Facility: CLINIC | Age: 49
End: 2024-11-08

## 2024-11-08 NOTE — TELEPHONE ENCOUNTER
Patient had to cancel an appt today due to being sick. Patient LVM on machine and I attempted to reach and left him a message to help him get r/s with Dr. Burgos.

## 2024-11-11 ENCOUNTER — OFFICE VISIT (OUTPATIENT)
Dept: ORTHOPEDICS CLINIC | Facility: CLINIC | Age: 49
End: 2024-11-11
Payer: COMMERCIAL

## 2024-11-11 ENCOUNTER — TELEPHONE (OUTPATIENT)
Dept: ORTHOPEDICS CLINIC | Facility: CLINIC | Age: 49
End: 2024-11-11

## 2024-11-11 DIAGNOSIS — M65.969 SYNOVITIS OF KNEE: ICD-10-CM

## 2024-11-11 DIAGNOSIS — S83.232A COMPLEX TEAR OF MEDIAL MENISCUS OF LEFT KNEE AS CURRENT INJURY, INITIAL ENCOUNTER: Primary | ICD-10-CM

## 2024-11-11 DIAGNOSIS — M94.262 CHONDROMALACIA OF LEFT KNEE: ICD-10-CM

## 2024-11-11 PROCEDURE — 99417 PROLNG OP E/M EACH 15 MIN: CPT | Performed by: ORTHOPAEDIC SURGERY

## 2024-11-11 PROCEDURE — 99215 OFFICE O/P EST HI 40 MIN: CPT | Performed by: ORTHOPAEDIC SURGERY

## 2024-11-11 NOTE — H&P (VIEW-ONLY)
Orthopaedic Surgery  [address] [place] [id number]  [phone]     PRE SURGICAL - HISTORY AND PHYSICAL EXAMINATION     Name: Pardeep Sims   MRN: [id number]  Date: 11/11/2024     CC: Left Knee Pain and mechanical symptoms    REFERRED BY: Shu Mckeon MD    HPI:   Pardeep Sims is a very pleasant 49 year old male who presents today for evaluation of Left knee pain and mechanical symptoms and recent completion of MRI demonstrating meniscal pathology.     To summarize: left knee pain onset July 2023 after a weighted backpack hike. Right knee pain also onset later in August 2023. Pain worsens with bending. This is accompanied by weakness. MRI of the right knee demonstrates a medial meniscus tear.     We have been managing conservatively with 3 right knee steroid injections on 9/29/23 + 11/20/23 + 10/23/24 and 2 left knee steroid injections on 12/6/23 and 10/23/24.     Since patient's previous visit, he completed an MRI of the left knee and is here for review and further management. He is having recurrent pain rated up to 10/10. He has completed 2 sessions of physical therapy.      Patient lives in [place]. He works as a . Enjoys riding his motorcycle. Lives at home with his wife and 2 kids.    PMH:   History reviewed. No pertinent past medical history.    PAST SURGICAL HX:  History reviewed. No pertinent surgical history.    FAMILY HX:  History reviewed. No pertinent family history.    ALLERGIES:  Patient has no known allergies.    MEDICATIONS:   Current Outpatient Medications   Medication Sig Dispense Refill    Tirzepatide-Weight Management (ZEPBOUND) 5 MG/0.5ML Subcutaneous Solution Auto-injector Inject 5 mg into the skin once a week for 4 doses. 2 mL 0    ALPRAZolam (XANAX) 0.5 MG Oral Tab Take 1 tablet (0.5 mg total) by mouth 2 (two) times daily as needed for Anxiety (anxiety with MRI.). 2 tablet 0    lisinopril 10 MG Oral Tab Take 1 tablet (10 mg total) by mouth daily. 90  tablet 1    ferrous sulfate 325 (65 FE) MG Oral Tab EC Take 1 tablet (325 mg total) by mouth daily with breakfast.      Cholecalciferol (VITAMIN D3) 1.25 MG (55568 UT) Oral Cap Take by mouth.      buprenorphine-naloxone 8-2 MG Sublingual Film Place 1 Film under the tongue in the morning and 1 Film before bedtime.         ROS: A comprehensive 14 point review of systems was performed and was negative aside from the aforementioned per history of present illness.    SOCIAL HX:  Social History     Tobacco Use    Smoking status: Never    Smokeless tobacco: Never   Substance Use Topics    Alcohol use: Never       PE:   There were no vitals filed for this visit.  Estimated body mass index is 48.84 kg/m² as calculated from the following:    Height as of 9/11/24: 5' 8\" (1.727 m).    Weight as of 9/11/24: 321 lb 3.2 oz.    Physical Exam  Constitutional:       Appearance: Normal appearance.   HENT:      Head: Normocephalic and atraumatic.   Eyes:      Extraocular Movements: Extraocular movements intact.   Neck:      Musculoskeletal: Normal range of motion and neck supple.   Cardiovascular:      Pulses: Normal pulses.   Pulmonary:      Effort: Pulmonary effort is normal. No respiratory distress.   Abdominal:      General: There is no distension.   Skin:     General: Skin is warm.      Capillary Refill: Capillary refill takes less than 2 seconds.      Findings: No bruising.   Neurological:      General: No focal deficit present.      Mental Status: Alert.   Psychiatric:         Mood and Affect: Mood normal.     Examination of the left knee demonstrates:     Skin is intact, warm and dry.   Atrophy: mild    Effusion: small    Joint line tenderness: medial  Crepitation: none   Michele: Positive   Patellar mobility: normal without apprehension  J-sign: none    ROM: Extension lacking 5 degrees  Flexion  125 degrees  ACL:  Negative Lachman, Negative Pivot Shift   PCL:  Negative Posterior Drawer  Collateral Ligaments: Stable to Varus  and Valgus stress at 0 and 30 degrees  Strength: normal   Hip joint: normal pain-free ROM   Gait:  normal   Leg length: equal and symmetric  Alignment:  neutral     No obvious peripheral edema noted.   Distal neurovascular exam demonstrates normal perfusion, intact sensation to light touch and full strength.     Examination of the contralateral knee demonstrates:  No significant atrophy, swelling or effusion. Full range of motion. Neurovascularly intact distally     Radiographic Examination/Diagnostics: MRI of the knee personally viewed, independently interpreted and radiology report was reviewed.    MRI KNEE, LEFT (XCW=[id number])    Result Date: 11/7/2024  Exam: MRI KNEE LT W/O CONTRAST CPT Code(s): 29474 - MRI JNT OF LWR EXTRE W/O DYE CLINICAL HISTORY: Positive Michele test of left knee (S83.207A) left knee pain, swelling, weakness and limited range of motion. TECHNIQUE: Non-infused, multiplanar and multi-sequential ultrahigh field 3.0 Camille MRI of the left knee was performed. COMPARISON: None. FINDINGS: There is a large joint effusion and trace fluid in the semimembranosus/medial gastrocnemius bursa. The ACL, PCL, MCL, LCL and distal quadriceps tendon insertion are intact. There is mild increased signal in the distal patellar tendon without discrete tear. Moderate ill-defined edema is seen within the popliteus muscle. The popliteus tendon is otherwise intact. There is increased volume of the lateral meniscus consistent with a partially discoid meniscus. Mild intrasubstance degenerative type signal changes seen in the anterior and posterior horns without discrete lateral meniscal tear. Abnormal increased signal contacts the superior articular surface of the posterior horn of the medial meniscus. There is extensive abnormal increased signal extending to the articular surfaces and inner margin of the body segment which appears extruded. There is displacement of meniscal tissue into the medial gutter inferiorly. The  anterior horn is grossly intact. Moderate to severe narrowing of the medial compartment is present with small regions of full-thickness cartilage loss on either side of the joint. There is a small region of high-grade chondromalacia with subchondral degenerative type signal change in the lateral femoral trochlea. There is mild narrowing of the lateral compartment. Edema-like signal seen in the bone marrow on either side of the medial compartment. No fracture or bony destructive lesion is identified. There is diffuse fatty atrophy of the musculature, likely related to disuse.     IMPRESSION:   1.Multidirectional tearing and maceration of the body of the medial meniscus which is extruded from the joint space.   2.Medial tibial femoral joint demonstrates severe cartilage degeneration with diffuse full-thickness cartilage thinning.   3.Large joint effusion with multiple intra-articular bodies.   4.High-grade sprain and partial-thickness tearing of the medial collateral ligament.   5.Fluid extending along the myofascial planes of the calf which may be secondary to rupture or leak of popliteal cyst.   Interpreting Radiologist: Carlos Beach M.D. Electronically Signed: 12/05/2023 09:45 AM      IMPRESSION: Pardeep Sims is a 49 year old male with left knee Medial Meniscus Tear, chondromalacia and synovitis sustained July 2023 after a weighted backpack hike.  They have failed extensive conservative treatment including Physical therapy greater than 6 weeks, intra-articular knee corticosteroid injection, oral anti-inflammatory medications, and activity modification.     Consequently, they are an appropriate candidate for knee arthroscopy, partial meniscectomy and extensive debridement/synovectomy.    PLAN:   I had a lengthy discussion with Pardeep about the diagnosis and options, both surgical and nonsurgical. I have recommended that we proceed with arthroscopic surgical treatment as we agree that this intervention will  likely offer the best opportunity for symptomatic relief and functional recovery. I used the MRI scan and a 3-dimensional knee model to outline his pathology, as well as general surgical principles. We reviewed the risks associated with arthroscopic partial meniscectomy and debridement / synovectomy.  In particular I emphasized that the displaced flap component and degenerative portion of the meniscus would be removed as this is dysvascular.  Simultaneously, I will perform a diagnostic knee arthroscopy of the knee and hope to identify and address any other pathology that may be encountered such as scar tissue, inflammation, cartilage pathology.  In particular we discussed risks that include, a low risk of infection (<1%), potential transient or permanent injury to nerves with superficial numbness, joint stiffness which may require additional physical therapy, persistent pain/lack of symptomatic improvement, need for future operation, wound complications (rare as incisions are very small), deep vein thrombosis (DVT) and pulmonary embolism (PE).   We discussed the proposed rehabilitation timeline as well as expected postoperative restrictions.  In this regard, I emphasized that there will be no weightbearing or range of motion restrictions post operatively.  Crutches will be provided initially for patient comfort and I will aim to have the patient begin physical therapy on postoperative day 1.  The advantage of this is to begin immediate mobility and range of motion to mitigate pain and encourage reduction of inflammation/swelling.  This will ultimately lead to a quicker postsurgical rehabilitation.  For most patients, this requires a total of 4 to 6 weeks of postsurgical physical therapy to attain full functional status after simple knee arthroscopy.  Pardeep voiced a good understanding of treatment options, risks and benefits, postoperative instructions, rehabilitation timeline, and restrictions. He was given the  opportunity to ask questions, which were all answered to the best of my ability and to his satisfaction. Pardeep will work with my office to arrange a time for surgery and obtain any medical clearance information necessary. My pre-operative information packet, which details the process and answers many FAQ's will be provided. He was encouraged to call the office with any further questions or concerns.  I spent 60 minutes in preparation to see the patient, counseling/education of relevant pathology, discussing imaging results, ordering post surgical physical therapy intervention, surgical counseling, and care coordination.        FOLLOW-UP:  Post-Operative Visit, POD 6 with Abhishek Soto PA-C.         Ange Vasquez MD  Knee, Shoulder, & Elbow Surgery / Sports Medicine Specialist  Orthopaedic Surgery  [address] [place] [address].org  [email or web address]  t: [phone]  o: [phone]  f: [phone]    This note was dictated using Dragon software.  While it was briefly proofread prior to completion, some grammatical, spelling, and word choice errors due to dictation may still occur.

## 2024-11-11 NOTE — PROGRESS NOTES
OR BOOKING SHEET KNEE ARTHROSCOPY  Location: [] [facility]   [x] [facility]  Name: Pardeep Sims  MRN: [id number]   : 1975  Diagnosis:  [x] Complex tear of medial meniscus of left knee as current injury, initial encounter [S83.496A]  Disposition:    [x] Ambulatory  Operative Time Required: 45 minutes ([facility])  Procedure:  Antibiotics: 2 g cefazolin within 60 minutes of surgical incision (3 g if > 120 kg)  Laterality: [] RIGHT  [x] LEFT                       [] BILATERAL  Procedures:   [x] Knee Arthroscopy     [x] Partial Medial Meniscectomy (76680)   [] Partial Lateral Meniscectomy (57114)                    [] Partial Medial and Lateral Meniscectomy (19431)     [] Lateral Meniscus Repair (84067)                    [] Medial Meniscus Repair (86672)     [x] Synovectomy (41253)   [x] Abrasionplasty (40559)     [] Partial Medial Meniscectomy vs Medial Meniscus Repair (06498 vs 83134)   [] Partial Lateral Meniscectomy vs Lateral Meniscus Repair (02162 vs 10058)   [] Irrigation & Debridement (39249)   [] Manipulation Under Anesthesia (31565)   [] Chondroplasty (48447)   [] Removal of Loose Body (58765)    Injections:   [] Stem cells from bone marrow aspiration (82426)    From:  [] Left Iliac crest  [] Right Iliac crest    To:  [] Left knee  [] Right knee  [] Other     Additional info:   [x] PCP Clearance Needed  [] MRSA  [x] Physical Therapy External - [facility]  [] DME Rx  [] Appt with Dr. Vasquez needed  Implants needed:  NONE  Positioning Equipment: Supine with Lateral Post

## 2024-11-11 NOTE — H&P
Orthopaedic Surgery  [address] [place] [id number]  [phone]     PRE SURGICAL - HISTORY AND PHYSICAL EXAMINATION     Name: Pardeep Sims   MRN: [id number]  Date: 11/11/2024     CC: Left Knee Pain and mechanical symptoms    REFERRED BY: Shu Mckeon MD    HPI:   aPrdeep Sims is a very pleasant 49 year old male who presents today for evaluation of Left knee pain and mechanical symptoms and recent completion of MRI demonstrating meniscal pathology.     To summarize: left knee pain onset July 2023 after a weighted backpack hike. Right knee pain also onset later in August 2023. Pain worsens with bending. This is accompanied by weakness. MRI of the right knee demonstrates a medial meniscus tear.     We have been managing conservatively with 3 right knee steroid injections on 9/29/23 + 11/20/23 + 10/23/24 and 2 left knee steroid injections on 12/6/23 and 10/23/24.     Since patient's previous visit, he completed an MRI of the left knee and is here for review and further management. He is having recurrent pain rated up to 10/10. He has completed 2 sessions of physical therapy.      Patient lives in [place]. He works as a . Enjoys riding his motorcycle. Lives at home with his wife and 2 kids.    PMH:   History reviewed. No pertinent past medical history.    PAST SURGICAL HX:  History reviewed. No pertinent surgical history.    FAMILY HX:  History reviewed. No pertinent family history.    ALLERGIES:  Patient has no known allergies.    MEDICATIONS:   Current Outpatient Medications   Medication Sig Dispense Refill    Tirzepatide-Weight Management (ZEPBOUND) 5 MG/0.5ML Subcutaneous Solution Auto-injector Inject 5 mg into the skin once a week for 4 doses. 2 mL 0    ALPRAZolam (XANAX) 0.5 MG Oral Tab Take 1 tablet (0.5 mg total) by mouth 2 (two) times daily as needed for Anxiety (anxiety with MRI.). 2 tablet 0    lisinopril 10 MG Oral Tab Take 1 tablet (10 mg total) by mouth daily. 90  tablet 1    ferrous sulfate 325 (65 FE) MG Oral Tab EC Take 1 tablet (325 mg total) by mouth daily with breakfast.      Cholecalciferol (VITAMIN D3) 1.25 MG (22831 UT) Oral Cap Take by mouth.      buprenorphine-naloxone 8-2 MG Sublingual Film Place 1 Film under the tongue in the morning and 1 Film before bedtime.         ROS: A comprehensive 14 point review of systems was performed and was negative aside from the aforementioned per history of present illness.    SOCIAL HX:  Social History     Tobacco Use    Smoking status: Never    Smokeless tobacco: Never   Substance Use Topics    Alcohol use: Never       PE:   There were no vitals filed for this visit.  Estimated body mass index is 48.84 kg/m² as calculated from the following:    Height as of 9/11/24: 5' 8\" (1.727 m).    Weight as of 9/11/24: 321 lb 3.2 oz.    Physical Exam  Constitutional:       Appearance: Normal appearance.   HENT:      Head: Normocephalic and atraumatic.   Eyes:      Extraocular Movements: Extraocular movements intact.   Neck:      Musculoskeletal: Normal range of motion and neck supple.   Cardiovascular:      Pulses: Normal pulses.   Pulmonary:      Effort: Pulmonary effort is normal. No respiratory distress.   Abdominal:      General: There is no distension.   Skin:     General: Skin is warm.      Capillary Refill: Capillary refill takes less than 2 seconds.      Findings: No bruising.   Neurological:      General: No focal deficit present.      Mental Status: Alert.   Psychiatric:         Mood and Affect: Mood normal.     Examination of the left knee demonstrates:     Skin is intact, warm and dry.   Atrophy: mild    Effusion: small    Joint line tenderness: medial  Crepitation: none   Michele: Positive   Patellar mobility: normal without apprehension  J-sign: none    ROM: Extension lacking 5 degrees  Flexion  125 degrees  ACL:  Negative Lachman, Negative Pivot Shift   PCL:  Negative Posterior Drawer  Collateral Ligaments: Stable to Varus  and Valgus stress at 0 and 30 degrees  Strength: normal   Hip joint: normal pain-free ROM   Gait:  normal   Leg length: equal and symmetric  Alignment:  neutral     No obvious peripheral edema noted.   Distal neurovascular exam demonstrates normal perfusion, intact sensation to light touch and full strength.     Examination of the contralateral knee demonstrates:  No significant atrophy, swelling or effusion. Full range of motion. Neurovascularly intact distally     Radiographic Examination/Diagnostics: MRI of the knee personally viewed, independently interpreted and radiology report was reviewed.    MRI KNEE, LEFT (CGJ=[id number])    Result Date: 11/7/2024  Exam: MRI KNEE LT W/O CONTRAST CPT Code(s): 70246 - MRI JNT OF LWR EXTRE W/O DYE CLINICAL HISTORY: Positive Michele test of left knee (S83.207A) left knee pain, swelling, weakness and limited range of motion. TECHNIQUE: Non-infused, multiplanar and multi-sequential ultrahigh field 3.0 Camille MRI of the left knee was performed. COMPARISON: None. FINDINGS: There is a large joint effusion and trace fluid in the semimembranosus/medial gastrocnemius bursa. The ACL, PCL, MCL, LCL and distal quadriceps tendon insertion are intact. There is mild increased signal in the distal patellar tendon without discrete tear. Moderate ill-defined edema is seen within the popliteus muscle. The popliteus tendon is otherwise intact. There is increased volume of the lateral meniscus consistent with a partially discoid meniscus. Mild intrasubstance degenerative type signal changes seen in the anterior and posterior horns without discrete lateral meniscal tear. Abnormal increased signal contacts the superior articular surface of the posterior horn of the medial meniscus. There is extensive abnormal increased signal extending to the articular surfaces and inner margin of the body segment which appears extruded. There is displacement of meniscal tissue into the medial gutter inferiorly. The  anterior horn is grossly intact. Moderate to severe narrowing of the medial compartment is present with small regions of full-thickness cartilage loss on either side of the joint. There is a small region of high-grade chondromalacia with subchondral degenerative type signal change in the lateral femoral trochlea. There is mild narrowing of the lateral compartment. Edema-like signal seen in the bone marrow on either side of the medial compartment. No fracture or bony destructive lesion is identified. There is diffuse fatty atrophy of the musculature, likely related to disuse.     IMPRESSION:   1.Multidirectional tearing and maceration of the body of the medial meniscus which is extruded from the joint space.   2.Medial tibial femoral joint demonstrates severe cartilage degeneration with diffuse full-thickness cartilage thinning.   3.Large joint effusion with multiple intra-articular bodies.   4.High-grade sprain and partial-thickness tearing of the medial collateral ligament.   5.Fluid extending along the myofascial planes of the calf which may be secondary to rupture or leak of popliteal cyst.   Interpreting Radiologist: Carlos Beach M.D. Electronically Signed: 12/05/2023 09:45 AM      IMPRESSION: Pardeep Sims is a 49 year old male with left knee Medial Meniscus Tear, chondromalacia and synovitis sustained July 2023 after a weighted backpack hike.  They have failed extensive conservative treatment including Physical therapy greater than 6 weeks, intra-articular knee corticosteroid injection, oral anti-inflammatory medications, and activity modification.     Consequently, they are an appropriate candidate for knee arthroscopy, partial meniscectomy and extensive debridement/synovectomy.    PLAN:   I had a lengthy discussion with Pardeep about the diagnosis and options, both surgical and nonsurgical. I have recommended that we proceed with arthroscopic surgical treatment as we agree that this intervention will  likely offer the best opportunity for symptomatic relief and functional recovery. I used the MRI scan and a 3-dimensional knee model to outline his pathology, as well as general surgical principles. We reviewed the risks associated with arthroscopic partial meniscectomy and debridement / synovectomy.  In particular I emphasized that the displaced flap component and degenerative portion of the meniscus would be removed as this is dysvascular.  Simultaneously, I will perform a diagnostic knee arthroscopy of the knee and hope to identify and address any other pathology that may be encountered such as scar tissue, inflammation, cartilage pathology.  In particular we discussed risks that include, a low risk of infection (<1%), potential transient or permanent injury to nerves with superficial numbness, joint stiffness which may require additional physical therapy, persistent pain/lack of symptomatic improvement, need for future operation, wound complications (rare as incisions are very small), deep vein thrombosis (DVT) and pulmonary embolism (PE).   We discussed the proposed rehabilitation timeline as well as expected postoperative restrictions.  In this regard, I emphasized that there will be no weightbearing or range of motion restrictions post operatively.  Crutches will be provided initially for patient comfort and I will aim to have the patient begin physical therapy on postoperative day 1.  The advantage of this is to begin immediate mobility and range of motion to mitigate pain and encourage reduction of inflammation/swelling.  This will ultimately lead to a quicker postsurgical rehabilitation.  For most patients, this requires a total of 4 to 6 weeks of postsurgical physical therapy to attain full functional status after simple knee arthroscopy.  Pardeep voiced a good understanding of treatment options, risks and benefits, postoperative instructions, rehabilitation timeline, and restrictions. He was given the  opportunity to ask questions, which were all answered to the best of my ability and to his satisfaction. Pardeep will work with my office to arrange a time for surgery and obtain any medical clearance information necessary. My pre-operative information packet, which details the process and answers many FAQ's will be provided. He was encouraged to call the office with any further questions or concerns.  I spent 60 minutes in preparation to see the patient, counseling/education of relevant pathology, discussing imaging results, ordering post surgical physical therapy intervention, surgical counseling, and care coordination.        FOLLOW-UP:  Post-Operative Visit, POD 6 with Abhishek Soto PA-C.         Ange Vasquez MD  Knee, Shoulder, & Elbow Surgery / Sports Medicine Specialist  Orthopaedic Surgery  [address] [place] [address].org  [email or web address]  t: [phone]  o: [phone]  f: [phone]    This note was dictated using Dragon software.  While it was briefly proofread prior to completion, some grammatical, spelling, and word choice errors due to dictation may still occur.

## 2024-11-11 NOTE — PROGRESS NOTES
Orthopaedic Surgery  [address] [place] [address]  [phone]     Name: Pardeep Sims   MRN: [id number]  Date: 11/11/2024     REASON FOR VISIT: Follow up -  MRI review of left knee pain   Right knee medial meniscus tear in the setting of ostearthritis.    INTERVAL HISTORY:   Pardeep Sims is a very pleasant 49 year old male who returns today for repeat evaluation of bilateral knee pain.     To summarize: left knee pain onset July 2023 after a weighted backpack hike. Right knee pain also onset later in August 2023. Pain worsens with bending. This is accompanied by weakness. MRI of the right knee demonstrates a medial meniscus tear.     We have been managing conservatively with 3 right knee steroid injections on 9/29/23 + 11/20/23 + 10/23/24 and 2 left knee steroid injections on 12/6/23 and 10/23/24.    Since patient's previous visit, he completed an MRI of the left knee and is here for review and further management. He is having recurrent pain rated up to 10/10. He has completed 2 sessions of physical therapy.      Patient lives in [place]. He works as a . Enjoys riding his motorcycle. Lives at home with his wife and 2 kids.    PE:   There were no vitals filed for this visit.  Estimated body mass index is 48.84 kg/m² as calculated from the following:    Height as of 9/11/24: 5' 8\" (1.727 m).    Weight as of 9/11/24: 321 lb 3.2 oz.    Physical Exam  Constitutional:       Appearance: Normal appearance.   HENT:      Head: Normocephalic and atraumatic.   Eyes:      Extraocular Movements: Extraocular movements intact.   Neck:      Musculoskeletal: Normal range of motion and neck supple.   Cardiovascular:      Pulses: Normal pulses.   Pulmonary:      Effort: Pulmonary effort is normal. No respiratory distress.   Abdominal:      General: There is no distension.   Skin:     General: Skin is warm.      Capillary Refill: Capillary refill takes less than 2 seconds.      Findings: No  bruising.   Neurological:      General: No focal deficit present.      Mental Status: She is alert.   Psychiatric:         Mood and Affect: Mood normal.     Examination of the knees demonstrates:     Skin is intact, warm and dry.     Right - ROM 0-120 degrees. Medial joint line tenderness.    Left - ROM 0-90 degrees. Medial joint line tenderness.    Radiographic Examination/Diagnostics:  Knee MRI personally viewed, independently interpreted and radiology report was reviewed.     MRI KNEE, RIGHT (HBT=[id number])     Result Date: 12/5/2023  Exam: MRI KNEE RT W/O CONTRAST CPT Code(s): 51576 - MRI JNT OF LWR EXTRE W/O DYE MRI RIGHT KNEE HISTORY: Unspecified tear of unspecified meniscus, current injury, right knee, initial encounter COMPARISON:  None currently available. TECHNIQUE: Routine MRI exam performed on a wide bore ultra high field 3.0 T Siemens Skyra MR scanner, without intravenous contrast. FINDINGS: Large joint effusion with multiple intra-articular bodies no popliteal cyst. Fluid extends through the semimembranosus/medial gastrocnemius bursa. Fluid signal extends in the myofascial planes along the superficial surface of the popliteal muscle. No fracture or worrisome bone lesion. Multi directional tearing and maceration of the body of the medial meniscus which is extruded from the joint space. The remainder of the medial meniscus appears intact. Lateral meniscus is intact. Distal quadriceps tendon, patellar tendon, anterior and posterior cruciate ligaments, lateral collateral ligament, distal IT band, popliteal tendon and biceps tendon intact. High-grade sprain and partial-thickness tearing of the medial collateral ligament. Medial tibial femoral joint space degenerative arthropathy characterized by marginal osteophyte formation, large zones of full-thickness cartilage thinning and subchondral reactive marrow edema on both sides of the joint. Lateral tibial femoral joint space and patellofemoral joint space  degenerative arthropathy characterized by very small marginal osteophytes and low-grade cartilage degeneration.     IMPRESSION:   1.Multidirectional tearing and maceration of the body of the medial meniscus which is extruded from the joint space.   2.Medial tibial femoral joint demonstrates severe cartilage degeneration with diffuse full-thickness cartilage thinning.   3.Large joint effusion with multiple intra-articular bodies.   4.High-grade sprain and partial-thickness tearing of the medial collateral ligament.   5.Fluid extending along the myofascial planes of the calf which may be secondary to rupture or leak of popliteal cyst.   Interpreting Radiologist: Carlos Beach M.D. Electronically Signed: 12/05/2023 09:45 AM    IMPRESSION: Pardeep Sims is a 49 year old male with right knee medial meniscus tear with osteoarthritis and suspected left knee mensicus tear sustained July 2023 after a weighted backpack hike. Temporary relief from 3 right knee steroid injections on 9/29/23 + 11/20/23 + 10/23/24 and 2 left knee steroid injections on 12/6/23 and 10/23/24.     We elected to maximize conservative management with repeat bilateral intra-articular corticosteroid and ketorolac injections. An MRI of the left knee was also ordered for further evaluation of possible meniscus pathology.     PLAN:   We reviewed the treatment of this disease condition.  Fortunately, treatment is amenable to conservative treatment which we chose to optimize at today's visit.  After a discussion of a variety of conservative treatment options, I recommended intra-articular injection with corticosteroid and ketorolac.       We elected to proceed with the injection procedure at today's visit. We discussed the risk and benefits of the procedure; including, but not limited to: infection, injury to blood vessels, nerve injury, prolonged pain, swelling, site soreness, failure to progress, and need for advanced treatments.  The patient voiced  understanding and agreed to proceed with the treatment plan.      In light of the chronicity of symptoms, loss of normal function, and  failure to progress conservatively we recommend an MRI to evaluate the integrity of the patient's findings. The patient will follow up after imaging.   Differential diagnosis includes but not limited to: cartilage injury/loose body, meniscus tear/injury, ACL tear, bone marrow edema, and osteoarthritis.     External records were also reviewed for pertinent historical findings contributing to the patients undiagnosed new problem with uncertain prognosis.      The patient had the opportunity to ask questions, and all questions were answered appropriately.      FOLLOW-UP:   Return to clinic after MRI completion.         Ange Vasquez MD  Knee, Shoulder, & Elbow Surgery / Sports Medicine Specialist  Orthopaedic Surgery  [address] [place] [address].[email or web address]  [email or web address]  t: [phone]  o: [phone]  f: [phone]    This note was dictated using Dragon software.  While it was briefly proofread prior to completion, some grammatical, spelling, and word choice errors due to dictation may still occur.

## 2024-11-11 NOTE — TELEPHONE ENCOUNTER
Date of Surgery:   2024      Post Op Appt:  12/3/2024 130PM    Case ID: [id number]     Notes: Lets please add for , thanks!             OR BOOKING SHEET KNEE ARTHROSCOPY  Location: [] [facility]                    [x] [facility]  Name: Pardeep Sims  MRN: [id number]   : 1975  Diagnosis:  [x] Complex tear of medial meniscus of left knee as current injury, initial encounter [M13.846A]  Disposition:    [x] Ambulatory  Operative Time Required: 45 minutes ([facility])  Procedure:  Antibiotics: 2 g cefazolin within 60 minutes of surgical incision (3 g if > 120 kg)  Laterality:                  [] RIGHT                  [x] LEFT                          [] BILATERAL  Procedures:                    [x] Knee Arthroscopy                                                   [x] Partial Medial Meniscectomy (77236)                    [] Partial Lateral Meniscectomy (28645)                    [] Partial Medial and Lateral Meniscectomy (98616)                       [] Lateral Meniscus Repair (21647)                    [] Medial Meniscus Repair (04247)                       [x] Synovectomy (83327)                    [x] Abrasionplasty (41553)                       [] Partial Medial Meniscectomy vs Medial Meniscus Repair (12865 vs 59811)                    [] Partial Lateral Meniscectomy vs Lateral Meniscus Repair (17802 vs 06280)                    [] Irrigation & Debridement (58281)                    [] Manipulation Under Anesthesia (19174)                    [] Chondroplasty (40527)                    [] Removal of Loose Body (47060)     Injections:                    [] Stem cells from bone marrow aspiration (50785)                                      From:                   [] Left Iliac crest                   [] Right Iliac crest                                      To:                   [] Left knee         [] Right knee                          [] Other      Additional info:   [x] PCP Clearance Needed  [] MRSA  [x]  Physical Therapy External - [facility]  [] DME Rx  [] Appt with Dr. Vasquez needed  Implants needed:  NONE  Positioning Equipment: Supine with Lateral Post

## 2024-11-11 NOTE — TELEPHONE ENCOUNTER
Spoke with patient and we scheduled surgery, post op and went over pre operative procedures. All questions answered.      PCP CLEARANCE

## 2024-11-12 NOTE — PAT NURSING NOTE
Spoke with Dr Sheriff office and informed of upcoming surgery and need for suboxone dosing instructions. Letter faxed to office. Requested to contact pt for medication directions.

## 2024-11-16 ENCOUNTER — OFFICE VISIT (OUTPATIENT)
Dept: FAMILY MEDICINE CLINIC | Facility: CLINIC | Age: 49
End: 2024-11-16
Payer: COMMERCIAL

## 2024-11-16 VITALS
RESPIRATION RATE: 18 BRPM | HEART RATE: 89 BPM | BODY MASS INDEX: 46.98 KG/M2 | WEIGHT: 310 LBS | HEIGHT: 68 IN | OXYGEN SATURATION: 99 % | SYSTOLIC BLOOD PRESSURE: 134 MMHG | DIASTOLIC BLOOD PRESSURE: 88 MMHG | TEMPERATURE: 97 F

## 2024-11-16 DIAGNOSIS — Z23 NEED FOR VACCINATION: ICD-10-CM

## 2024-11-16 DIAGNOSIS — Z01.818 PREOPERATIVE CLEARANCE: Primary | ICD-10-CM

## 2024-11-16 DIAGNOSIS — F11.11 HX OF OPIOID ABUSE (HCC): ICD-10-CM

## 2024-11-16 LAB
ATRIAL RATE: 82 BPM
P AXIS: 59 DEGREES
P-R INTERVAL: 156 MS
Q-T INTERVAL: 380 MS
QRS DURATION: 84 MS
QTC CALCULATION (BEZET): 443 MS
R AXIS: 55 DEGREES
T AXIS: 58 DEGREES
VENTRICULAR RATE: 82 BPM

## 2024-11-16 PROCEDURE — 3079F DIAST BP 80-89 MM HG: CPT | Performed by: FAMILY MEDICINE

## 2024-11-16 PROCEDURE — 99244 OFF/OP CNSLTJ NEW/EST MOD 40: CPT | Performed by: FAMILY MEDICINE

## 2024-11-16 PROCEDURE — 3008F BODY MASS INDEX DOCD: CPT | Performed by: FAMILY MEDICINE

## 2024-11-16 PROCEDURE — 93000 ELECTROCARDIOGRAM COMPLETE: CPT | Performed by: FAMILY MEDICINE

## 2024-11-16 PROCEDURE — 3075F SYST BP GE 130 - 139MM HG: CPT | Performed by: FAMILY MEDICINE

## 2024-11-16 NOTE — PROGRESS NOTES
Pardeep Sims is a 49 year old male who presents for a pre-operative physical exam.     Patient having left knee arthroscopy partial medial  meniscectomy,synovectomy,abrasionplasty with Dr Rodriguez on 11/26/2024 at [facility].     HPI related to surgery:   Pardeep Sims is a very pleasant 49 year old male who presents today for evaluation of Left knee pain and mechanical symptoms and recent completion of MRI demonstrating meniscal pathology.      To summarize: left knee pain onset July 2023 after a weighted backpack hike. Right knee pain also onset later in August 2023. Pain worsens with bending. This is accompanied by weakness. MRI of the right knee demonstrates a medial meniscus tear.     We have been managing conservatively with 3 right knee steroid injections on 9/29/23 + 11/20/23 + 10/23/24 and 2 left knee steroid injections on 12/6/23 and 10/23/24.    He  has had previous anesthesia:  Yes.  Previous complications:  No    Social History     Socioeconomic History    Marital status:    Tobacco Use    Smoking status: Never    Smokeless tobacco: Never   Vaping Use    Vaping status: Never Used   Substance and Sexual Activity    Alcohol use: Not Currently    Drug use: Not Currently     Types: Oxycodone     Comment: Addicted to pain medication post operatively with bariatric surgery.    Sexual activity: Yes     Partners: Female   Other Topics Concern     Service No    Weight Concern Yes    Exercise No      Past Medical History:    High blood pressure    Osteoarthritis    bilateral knee     Past surgical hx:   Bariatric Surgery, Gallbladder, and vasectomy.     Allergies: Allergies[1]      Current Outpatient Medications   Medication Sig Dispense Refill    Tirzepatide-Weight Management (ZEPBOUND) 5 MG/0.5ML Subcutaneous Solution Auto-injector Inject 5 mg into the skin once a week for 4 doses. 2 mL 0    ALPRAZolam (XANAX) 0.5 MG Oral Tab Take 1 tablet (0.5 mg total) by mouth 2 (two) times daily as  needed for Anxiety (anxiety with MRI.). 2 tablet 0    lisinopril 10 MG Oral Tab Take 1 tablet (10 mg total) by mouth daily. 90 tablet 1    ferrous sulfate 325 (65 FE) MG Oral Tab EC Take 1 tablet (325 mg total) by mouth daily with breakfast.      Cholecalciferol (VITAMIN D3) 1.25 MG (68230 UT) Oral Cap Take by mouth.      buprenorphine-naloxone 8-2 MG Sublingual Film Place 1 Film under the tongue in the morning and 1 Film before bedtime. 4 X daily per Dr Sheriff office 11/12/24.          REVIEW OF SYSTEMS:   Review of Systems   Constitutional:  Positive for fatigue.   Musculoskeletal:  Positive for arthralgias and joint swelling (knee pain).      Pertinent Negatives Patient denies all of the following:    Diagnosis Date Noted Comments   History of adverse reaction to anesthesia [Z92.89] 11/12/2024    Anesthesia complication [T88.59XA] 11/12/2024    Difficult intubation [T88.4XXA] 11/12/2024    Family history of pseudocholinesterase deficiency [Z83.49] 11/12/2024    Family history of malignant hyperthermia [Z84.89] 11/12/2024    Hx of motion sickness [Z87.898] 11/12/2024    Malignant hyperthermia [T88.3XXA] 11/12/2024    Pseudocholinesterase deficiency [E88.09] 11/12/2024    PONV (postoperative nausea and vomiting) [R11.2, Z98.890] 11/12/2024        PHYSICAL EXAM:   /88 (BP Location: Left arm, Patient Position: Sitting, Cuff Size: large)   Pulse 89   Temp 97.4 °F (36.3 °C) (Temporal)   Resp 18   Ht 5' 8\" (1.727 m)   Wt (!) 310 lb (140.6 kg)   SpO2 99%   BMI 47.14 kg/m²    Physical Exam  Constitutional:       General: He is not in acute distress.     Appearance: Normal appearance. He is obese. He is not ill-appearing.   HENT:      Head: Normocephalic and atraumatic.      Nose: No congestion or rhinorrhea.      Mouth/Throat:      Mouth: Mucous membranes are moist.      Pharynx: Oropharynx is clear. No oropharyngeal exudate or posterior oropharyngeal erythema.   Cardiovascular:      Rate and Rhythm: Normal  rate and regular rhythm.      Pulses: Normal pulses.      Heart sounds: Normal heart sounds.   Pulmonary:      Effort: Pulmonary effort is normal.      Breath sounds: Normal breath sounds.   Abdominal:      General: Abdomen is flat.      Palpations: Abdomen is soft.   Musculoskeletal:         General: Swelling (knee swelling.) present. Normal range of motion.      Cervical back: Normal range of motion and neck supple.   Skin:     General: Skin is warm and dry.      Capillary Refill: Capillary refill takes less than 2 seconds.   Neurological:      Mental Status: He is alert.   Psychiatric:         Mood and Affect: Mood normal.         Behavior: Behavior normal.         Thought Content: Thought content normal.         Judgment: Judgment normal.          LABORATORY DATA:     Pending lab results.     Office Visit on 11/16/2024   Component Date Value Ref Range Status    Ventricular rate 11/16/2024 82  BPM Incomplete    Atrial rate 11/16/2024 82  BPM Incomplete    P-R Interval 11/16/2024 156  ms Incomplete    QRS Duration 11/16/2024 84  ms Incomplete    Q-T Interval 11/16/2024 380  ms Incomplete    QTC Calculation (Bezet) 11/16/2024 443  ms Incomplete    P Axis 11/16/2024 59  degrees Incomplete    R Axis 11/16/2024 55  degrees Incomplete    T Axis 11/16/2024 58  degrees Incomplete         ASSESSMENT AND PLAN:   Pardeep Sims does not have significant history of cardiac or pulmonary conditions.   He is a good surgical candidate. This consult was sent back the referring physician, Dr. Rodriguez.     Dr. Rodriguez's office has spoke with Dr Sheriff office and informed of upcoming surgery and need for suboxone dosing instructions. Patient has history of opioid abuse from post surgical medications in the past. Please follow Dr. Sheriff's advice for post operative pain management.   Patient states that his last dose of Zepbound 5 mg was on Tuesday November 12 th 2024.   Patient instructed to inform Dr. Rodriguez's office and not to  take any other doses of zepbound until cleared to postoperatively.   Assessment:  Encounter Diagnoses   Name Primary?    Preoperative clearance Yes    Need for vaccination     Hx of opioid abuse (HCC)          Plan   Orders Placed This Encounter   Procedures    Basic Metabolic Panel (8) [E]    ELECTROLYTE PANEL [23828]     Do not take any NSAIDs, aspirin, fish oil, or any herbal supplements that would prolong bleeding for 10 days prior to the start of surgery. Follow all preoperative instructions from the surgeon and anesthesia. If unsure of any instructions please contact the office.    Per my review patient is medically cleared for surgical procedure discussed above.   CELINA Best        [1] No Known Allergies

## 2024-11-18 ENCOUNTER — TELEPHONE (OUTPATIENT)
Dept: FAMILY MEDICINE CLINIC | Facility: CLINIC | Age: 49
End: 2024-11-18

## 2024-11-18 NOTE — TELEPHONE ENCOUNTER
Pre Admission testing is calling to get EKG with tracing results.    Surgery date 11/26/2024    Please fax to Sameera at [phone]

## 2024-11-19 ENCOUNTER — LABORATORY ENCOUNTER (OUTPATIENT)
Dept: LAB | Age: 49
End: 2024-11-19
Attending: FAMILY MEDICINE
Payer: COMMERCIAL

## 2024-11-19 LAB
ALBUMIN SERPL-MCNC: 4 G/DL (ref 3.2–4.8)
ALBUMIN/GLOB SERPL: 1.4 {RATIO} (ref 1–2)
ALP LIVER SERPL-CCNC: 84 U/L
ALT SERPL-CCNC: 20 U/L
ANION GAP SERPL CALC-SCNC: 7 MMOL/L (ref 0–18)
AST SERPL-CCNC: 15 U/L (ref ?–34)
BASOPHILS # BLD AUTO: 0.08 X10(3) UL (ref 0–0.2)
BASOPHILS NFR BLD AUTO: 1 %
BILIRUB SERPL-MCNC: 0.5 MG/DL (ref 0.3–1.2)
BUN BLD-MCNC: 15 MG/DL (ref 9–23)
CALCIUM BLD-MCNC: 9.3 MG/DL (ref 8.7–10.4)
CHLORIDE SERPL-SCNC: 109 MMOL/L (ref 98–112)
CHOLEST SERPL-MCNC: 162 MG/DL (ref ?–200)
CO2 SERPL-SCNC: 27 MMOL/L (ref 21–32)
CREAT BLD-MCNC: 0.79 MG/DL
EGFRCR SERPLBLD CKD-EPI 2021: 109 ML/MIN/1.73M2 (ref 60–?)
EOSINOPHIL # BLD AUTO: 0.14 X10(3) UL (ref 0–0.7)
EOSINOPHIL NFR BLD AUTO: 1.8 %
ERYTHROCYTE [DISTWIDTH] IN BLOOD BY AUTOMATED COUNT: 13.3 %
FASTING PATIENT LIPID ANSWER: YES
FASTING STATUS PATIENT QL REPORTED: YES
GLOBULIN PLAS-MCNC: 2.9 G/DL (ref 2–3.5)
GLUCOSE BLD-MCNC: 108 MG/DL (ref 70–99)
HCT VFR BLD AUTO: 36 %
HDLC SERPL-MCNC: 37 MG/DL (ref 40–59)
HGB BLD-MCNC: 12.5 G/DL
IMM GRANULOCYTES # BLD AUTO: 0.06 X10(3) UL (ref 0–1)
IMM GRANULOCYTES NFR BLD: 0.8 %
LDLC SERPL CALC-MCNC: 104 MG/DL (ref ?–100)
LYMPHOCYTES # BLD AUTO: 1.22 X10(3) UL (ref 1–4)
LYMPHOCYTES NFR BLD AUTO: 15.4 %
MCH RBC QN AUTO: 30.6 PG (ref 26–34)
MCHC RBC AUTO-ENTMCNC: 34.7 G/DL (ref 31–37)
MCV RBC AUTO: 88.2 FL
MONOCYTES # BLD AUTO: 0.29 X10(3) UL (ref 0.1–1)
MONOCYTES NFR BLD AUTO: 3.7 %
NEUTROPHILS # BLD AUTO: 6.12 X10 (3) UL (ref 1.5–7.7)
NEUTROPHILS # BLD AUTO: 6.12 X10(3) UL (ref 1.5–7.7)
NEUTROPHILS NFR BLD AUTO: 77.3 %
NONHDLC SERPL-MCNC: 125 MG/DL (ref ?–130)
OSMOLALITY SERPL CALC.SUM OF ELEC: 297 MOSM/KG (ref 275–295)
PLATELET # BLD AUTO: 184 10(3)UL (ref 150–450)
POTASSIUM SERPL-SCNC: 4.2 MMOL/L (ref 3.5–5.1)
PROT SERPL-MCNC: 6.9 G/DL (ref 5.7–8.2)
PSA SERPL-MCNC: 0.34 NG/ML (ref ?–4)
RBC # BLD AUTO: 4.08 X10(6)UL
SODIUM SERPL-SCNC: 143 MMOL/L (ref 136–145)
TRIGL SERPL-MCNC: 117 MG/DL (ref 30–149)
TSI SER-ACNC: 2.19 UIU/ML (ref 0.55–4.78)
VLDLC SERPL CALC-MCNC: 20 MG/DL (ref 0–30)
WBC # BLD AUTO: 7.9 X10(3) UL (ref 4–11)

## 2024-11-19 PROCEDURE — 85025 COMPLETE CBC W/AUTO DIFF WBC: CPT | Performed by: FAMILY MEDICINE

## 2024-11-19 PROCEDURE — 84443 ASSAY THYROID STIM HORMONE: CPT | Performed by: FAMILY MEDICINE

## 2024-11-19 PROCEDURE — 36415 COLL VENOUS BLD VENIPUNCTURE: CPT | Performed by: FAMILY MEDICINE

## 2024-11-19 PROCEDURE — 80061 LIPID PANEL: CPT | Performed by: FAMILY MEDICINE

## 2024-11-19 PROCEDURE — 80053 COMPREHEN METABOLIC PANEL: CPT | Performed by: FAMILY MEDICINE

## 2024-11-19 PROCEDURE — 84153 ASSAY OF PSA TOTAL: CPT | Performed by: FAMILY MEDICINE

## 2024-11-25 ENCOUNTER — TELEPHONE (OUTPATIENT)
Dept: FAMILY MEDICINE CLINIC | Facility: CLINIC | Age: 49
End: 2024-11-25

## 2024-11-25 RX ORDER — ONDANSETRON 4 MG/1
TABLET, FILM COATED ORAL
Qty: 8 TABLET | Refills: 0 | Status: SHIPPED | OUTPATIENT
Start: 2024-11-25

## 2024-11-25 RX ORDER — MELOXICAM 15 MG/1
15 TABLET ORAL DAILY
Qty: 14 TABLET | Refills: 1 | Status: SHIPPED | OUTPATIENT
Start: 2024-11-25

## 2024-11-25 NOTE — TELEPHONE ENCOUNTER
Pre-op H/P,EKG and lab work faxed to Dr Vasquez at [phone] and Pre Admission Testing at [phone]. Confirmation received.

## 2024-11-25 NOTE — DISCHARGE INSTRUCTIONS
Knee Arthroscopy - Meniscectomy / Debridement  Postoperative Guidelines      This document will help you plan for your post-operative recovery course following surgery. Please read and retain this information for future reference. Many of the questions you may have later can be answered by referring to this information.      DIET   Begin with clear liquids and light foods (jellos, soups, etc.).   Progress to your normal diet if you are not experiencing nausea.       WOUND CARE   Please keep the ACE wrap on for the first 2-3 days, you may remove the ACE wrap for ice therapy.  Underneath the ACE wrap are waterproof bandages, please keep these in place until your first post-op appointment with Dr. Vasquez.  Under the waterproof bandages is Dermabond, this is a surgical glue and tape that is used in conjunction with absorbable stitches to close the incision.   Please do not touch the Dermabond or place any ointments lotions or creams directly over the incisions.  You may shower after removing the ACE wrap by placing Saran wrap around the leg and covering the bandages.  NO soaking of the operative leg (ie: bath or pool) is allowed until 6 weeks after surgery.     CRUTCHES   Crutches are to aid your progression to walking.   You may discontinue the crutches as soon as you are comfortable with full weight on the leg (usually 1-3 days). Your physical therapist may guide you with this process.     PAIN MANAGEMENT  Local numbing medications are injected into the wound around the knee at the time of surgery. These will wear off within 8-12 hours and it is not uncommon for you to encounter more pain on the first or second day after surgery when swelling peaks.   Do not drive a car or operate machinery while taking the narcotic medication.   Please avoid alcohol use while taking the narcotic pain medication.     NON-NARCOTIC PAIN MEDICATIONS   Extra-Strength Tylenol (Acetaminophen) 500mg Tablets (available over the  counter)  Indication: pain, non-narcotic pain reliever  Use: Take 1-2 tablets every 6 hours.  Do not exceed 8 tablets in a 24-hour period.  Mobic (Meloxicam) 15mg Tablets (Prescription sent to pharmacy)  Indication: pain and anti-inflammatory, non-narcotic pain reliever  Use: Take 1 tablets daily with meals for the first 14 days after surgery.  Side Effects: upset stomach, acid reflux.  If this occurs, stop the medication.    NARCOTIC PAIN MEDICATION  Tramadol is a prescription pain medication that should only be used if adequate pain control is not achieved with combination of ice, extra-strength Tylenol and Aleve as outlined above.   Side effects include constipation, nausea, drowsiness, dry mouth, itchiness.  Use a 0-10 pain scale to decide how much medication to take:                                Pain 0-4/10: No tramadol necessary                                Pain 5-7/10: Take one tablet                                 Pain 8-10/10: Take two tablets   Tramadol 50mg tablet  Indication: pain 5/10 or greater.  Narcotic pain medication.  Use: 1-2 tabs every 4-6 hours as needed for pain.  Do not exceed more than 10 tabs in any 24-hour time period unless otherwise directed.    MEDICATIONS TO MANAGE SIDE EFFECTS  Narcotics may cause nausea and constipation. Bowel regimen is recommended.  Colace (Docusate) 100 mg - available OTC  Indication:  constipation, stool softener.  Take consistently while on narcotics.  Use:  Take 1 pill three times per day while you are taking narcotics.    Senokot (Senna) 8.6 mg - available OTC  Indication: constipation, stool laxative.  Take consistently while on narcotics.   Use: Take 2 pills at bedtime.  Increase to 2 pills twice daily if no bowel movement by post-surgery day two.    Zofran (Ondansetron ODT) 4 mg- Prescription sent to pharmacy  Indication: nausea.  Take as needed.  Use: Take 1 pill AS NEEDED every 8 hours, do not exceed 3 pills in 24 hours      ACTIVITY   Raise the  operative leg to chest level whenever possible to decrease swelling.   Do not place pillows under knees (i.e. do not maintain knee in a flexed or bent position), but rather place pillows under the foot/ankle.   Use crutches to assist with walking for the first 24-48 hours  Do not engage in activities which increase knee pain/swelling (prolonged periods of standing or walking) for the first 2 weeks following surgery.   Avoid long periods of sitting (without leg elevated) or long distance traveling for 2 weeks.   NO driving until instructed otherwise by physician.   You may return to sedentary work or school 1-2 days after surgery if feeling well.    You will not be needing a knee brace      ICE THERAPY   Icing is very important in the initial post-operative period and should begin immediately after surgery.   Use ice packs / bags for 30-45 minutes every 2 hours daily until there is no swelling and pain is relieved - remember to keep leg elevated to level of chest while icing. Care should be taken with icing to avoid frostbite to the skin.       EXERCISES   Begin exercises 24 hours after surgery, including knee extension, quad sets, straight leg raises, and active flexion / extension unless otherwise instructed. (Please see handout)  Discomfort and knee stiffness are normal for a few days following surgery. It is safe to bend your knee in a non-weightbearing position when performing exercises unless otherwise instructed.   Complete exercises 3-4 times daily until your first post-operative visit - your motion goals are to have complete extension (straightening) and 90 degrees of flexion (bending) at your first post- operative appointment unless otherwise instructed.   Perform ankle pumps continuously throughout the day to reduce the risk of developing a blood clot in your calf.   Formal physical therapy (PT) typically begins as soon as possible, ideally 1-3 days after surgery. A prescription and protocol will be  provided prior to surgery.            EMERGENCIES**   Contact Dr. Vasquez’s Team via [employer] or [phone] if any of the following are present:     Painful swelling or numbness (note that some swelling and numbness is normal).   Unrelenting pain.  Fever (over 101° - it is normal to have a low-grade fever for the first day or two following surgery) or chills.   Redness around incisions.   Color change in foot or ankle.   Continuous drainage or bleeding from incision (a small amount of drainage is expected).   Difficulty breathing.   Excessive nausea/vomiting   Severe calf pain.   If you have an emergency after office hours or on the weekend, contact the office at [phone] and you will be connected to our pager service. This will connect you with the Physician on call.   If you have an emergency that requires immediate attention proceed to the nearest emergency room.       FOLLOW-UP CARE/QUESTIONS   Your first post-operative appointment will be 7-10 days following surgery for wound evaluation and to answer any questions you have regarding the procedure.   Typically, the first physical therapy appointment is made for 1-3 days following surgery. This prescription will be communicated prior to surgery.  If you have any further questions please contact Dr. Vasquez’s team directly.

## 2024-11-26 ENCOUNTER — ANESTHESIA EVENT (OUTPATIENT)
Dept: SURGERY | Facility: HOSPITAL | Age: 49
End: 2024-11-26
Payer: COMMERCIAL

## 2024-11-26 ENCOUNTER — HOSPITAL ENCOUNTER (OUTPATIENT)
Facility: HOSPITAL | Age: 49
Setting detail: HOSPITAL OUTPATIENT SURGERY
Discharge: HOME | End: 2024-11-26
Attending: ORTHOPAEDIC SURGERY | Admitting: ORTHOPAEDIC SURGERY
Payer: COMMERCIAL

## 2024-11-26 ENCOUNTER — ANESTHESIA (OUTPATIENT)
Dept: SURGERY | Facility: HOSPITAL | Age: 49
End: 2024-11-26
Payer: COMMERCIAL

## 2024-11-26 VITALS
HEART RATE: 63 BPM | DIASTOLIC BLOOD PRESSURE: 84 MMHG | TEMPERATURE: 98 F | HEIGHT: 68 IN | OXYGEN SATURATION: 98 % | WEIGHT: 303 LBS | SYSTOLIC BLOOD PRESSURE: 126 MMHG | RESPIRATION RATE: 16 BRPM | BODY MASS INDEX: 45.92 KG/M2

## 2024-11-26 PROCEDURE — 0SBC4ZX EXCISION OF RIGHT KNEE JOINT, PERCUTANEOUS ENDOSCOPIC APPROACH, DIAGNOSTIC: ICD-10-PCS | Performed by: ORTHOPAEDIC SURGERY

## 2024-11-26 PROCEDURE — 0SBC4ZZ EXCISION OF RIGHT KNEE JOINT, PERCUTANEOUS ENDOSCOPIC APPROACH: ICD-10-PCS | Performed by: ORTHOPAEDIC SURGERY

## 2024-11-26 RX ORDER — MIDAZOLAM HYDROCHLORIDE 1 MG/ML
INJECTION INTRAMUSCULAR; INTRAVENOUS AS NEEDED
Status: DISCONTINUED | OUTPATIENT
Start: 2024-11-26 | End: 2024-11-26 | Stop reason: SURG

## 2024-11-26 RX ORDER — CEFAZOLIN SODIUM IN 0.9 % NACL 3 G/100 ML
3 INTRAVENOUS SOLUTION, PIGGYBACK (ML) INTRAVENOUS ONCE
Status: COMPLETED | OUTPATIENT
Start: 2024-11-26 | End: 2024-11-26

## 2024-11-26 RX ORDER — LIDOCAINE HYDROCHLORIDE 10 MG/ML
INJECTION, SOLUTION EPIDURAL; INFILTRATION; INTRACAUDAL; PERINEURAL AS NEEDED
Status: DISCONTINUED | OUTPATIENT
Start: 2024-11-26 | End: 2024-11-26 | Stop reason: SURG

## 2024-11-26 RX ORDER — SODIUM CHLORIDE, SODIUM LACTATE, POTASSIUM CHLORIDE, CALCIUM CHLORIDE 600; 310; 30; 20 MG/100ML; MG/100ML; MG/100ML; MG/100ML
INJECTION, SOLUTION INTRAVENOUS CONTINUOUS
Status: DISCONTINUED | OUTPATIENT
Start: 2024-11-26 | End: 2024-11-26

## 2024-11-26 RX ORDER — HYDROCODONE BITARTRATE AND ACETAMINOPHEN 10; 325 MG/1; MG/1
1 TABLET ORAL ONCE AS NEEDED
Status: DISCONTINUED | OUTPATIENT
Start: 2024-11-26 | End: 2024-11-26

## 2024-11-26 RX ORDER — HYDROMORPHONE HYDROCHLORIDE 1 MG/ML
0.6 INJECTION, SOLUTION INTRAMUSCULAR; INTRAVENOUS; SUBCUTANEOUS EVERY 5 MIN PRN
Status: DISCONTINUED | OUTPATIENT
Start: 2024-11-26 | End: 2024-11-26

## 2024-11-26 RX ORDER — CEFAZOLIN SODIUM IN 0.9 % NACL 3 G/100 ML
INTRAVENOUS SOLUTION, PIGGYBACK (ML) INTRAVENOUS
Status: DISCONTINUED
Start: 2024-11-26 | End: 2024-11-26

## 2024-11-26 RX ORDER — DEXAMETHASONE SODIUM PHOSPHATE 4 MG/ML
VIAL (ML) INJECTION AS NEEDED
Status: DISCONTINUED | OUTPATIENT
Start: 2024-11-26 | End: 2024-11-26 | Stop reason: SURG

## 2024-11-26 RX ORDER — ACETAMINOPHEN 500 MG
1000 TABLET ORAL ONCE AS NEEDED
Status: DISCONTINUED | OUTPATIENT
Start: 2024-11-26 | End: 2024-11-26

## 2024-11-26 RX ORDER — KETAMINE HYDROCHLORIDE 50 MG/ML
INJECTION, SOLUTION INTRAMUSCULAR; INTRAVENOUS AS NEEDED
Status: DISCONTINUED | OUTPATIENT
Start: 2024-11-26 | End: 2024-11-26 | Stop reason: SURG

## 2024-11-26 RX ORDER — HYDROCODONE BITARTRATE AND ACETAMINOPHEN 10; 325 MG/1; MG/1
2 TABLET ORAL ONCE AS NEEDED
Status: DISCONTINUED | OUTPATIENT
Start: 2024-11-26 | End: 2024-11-26

## 2024-11-26 RX ORDER — ACETAMINOPHEN 500 MG
1000 TABLET ORAL ONCE
Status: DISCONTINUED | OUTPATIENT
Start: 2024-11-26 | End: 2024-11-26 | Stop reason: HOSPADM

## 2024-11-26 RX ORDER — HYDROMORPHONE HYDROCHLORIDE 1 MG/ML
0.2 INJECTION, SOLUTION INTRAMUSCULAR; INTRAVENOUS; SUBCUTANEOUS EVERY 5 MIN PRN
Status: DISCONTINUED | OUTPATIENT
Start: 2024-11-26 | End: 2024-11-26

## 2024-11-26 RX ORDER — SCOLOPAMINE TRANSDERMAL SYSTEM 1 MG/1
1 PATCH, EXTENDED RELEASE TRANSDERMAL ONCE
Status: DISCONTINUED | OUTPATIENT
Start: 2024-11-26 | End: 2024-11-26 | Stop reason: HOSPADM

## 2024-11-26 RX ORDER — HYDROMORPHONE HYDROCHLORIDE 1 MG/ML
INJECTION, SOLUTION INTRAMUSCULAR; INTRAVENOUS; SUBCUTANEOUS
Status: COMPLETED
Start: 2024-11-26 | End: 2024-11-26

## 2024-11-26 RX ORDER — BUPIVACAINE HYDROCHLORIDE AND EPINEPHRINE 5; 5 MG/ML; UG/ML
INJECTION, SOLUTION EPIDURAL; INTRACAUDAL; PERINEURAL AS NEEDED
Status: DISCONTINUED | OUTPATIENT
Start: 2024-11-26 | End: 2024-11-26 | Stop reason: HOSPADM

## 2024-11-26 RX ORDER — ONDANSETRON 2 MG/ML
INJECTION INTRAMUSCULAR; INTRAVENOUS AS NEEDED
Status: DISCONTINUED | OUTPATIENT
Start: 2024-11-26 | End: 2024-11-26 | Stop reason: SURG

## 2024-11-26 RX ORDER — ROCURONIUM BROMIDE 10 MG/ML
INJECTION, SOLUTION INTRAVENOUS AS NEEDED
Status: DISCONTINUED | OUTPATIENT
Start: 2024-11-26 | End: 2024-11-26 | Stop reason: SURG

## 2024-11-26 RX ORDER — PROCHLORPERAZINE EDISYLATE 5 MG/ML
5 INJECTION INTRAMUSCULAR; INTRAVENOUS EVERY 8 HOURS PRN
Status: DISCONTINUED | OUTPATIENT
Start: 2024-11-26 | End: 2024-11-26

## 2024-11-26 RX ORDER — NALOXONE HYDROCHLORIDE 0.4 MG/ML
0.08 INJECTION, SOLUTION INTRAMUSCULAR; INTRAVENOUS; SUBCUTANEOUS AS NEEDED
Status: DISCONTINUED | OUTPATIENT
Start: 2024-11-26 | End: 2024-11-26

## 2024-11-26 RX ORDER — ONDANSETRON 2 MG/ML
4 INJECTION INTRAMUSCULAR; INTRAVENOUS EVERY 6 HOURS PRN
Status: DISCONTINUED | OUTPATIENT
Start: 2024-11-26 | End: 2024-11-26

## 2024-11-26 RX ORDER — HYDROMORPHONE HYDROCHLORIDE 1 MG/ML
0.4 INJECTION, SOLUTION INTRAMUSCULAR; INTRAVENOUS; SUBCUTANEOUS EVERY 5 MIN PRN
Status: DISCONTINUED | OUTPATIENT
Start: 2024-11-26 | End: 2024-11-26

## 2024-11-26 RX ADMIN — MIDAZOLAM HYDROCHLORIDE 2 MG: 1 INJECTION INTRAMUSCULAR; INTRAVENOUS at 07:05:00

## 2024-11-26 RX ADMIN — SODIUM CHLORIDE, SODIUM LACTATE, POTASSIUM CHLORIDE, CALCIUM CHLORIDE: 600; 310; 30; 20 INJECTION, SOLUTION INTRAVENOUS at 08:12:00

## 2024-11-26 RX ADMIN — CEFAZOLIN SODIUM IN 0.9 % NACL 3 G: 3 G/100 ML INTRAVENOUS SOLUTION, PIGGYBACK (ML) INTRAVENOUS at 07:17:00

## 2024-11-26 RX ADMIN — KETAMINE HYDROCHLORIDE 20 MG: 50 INJECTION, SOLUTION INTRAMUSCULAR; INTRAVENOUS at 07:25:00

## 2024-11-26 RX ADMIN — CEFAZOLIN SODIUM IN 0.9 % NACL: 3 G/100 ML INTRAVENOUS SOLUTION, PIGGYBACK (ML) INTRAVENOUS at 08:21:00

## 2024-11-26 RX ADMIN — KETAMINE HYDROCHLORIDE 10 MG: 50 INJECTION, SOLUTION INTRAMUSCULAR; INTRAVENOUS at 08:00:00

## 2024-11-26 RX ADMIN — ROCURONIUM BROMIDE 50 MG: 10 INJECTION, SOLUTION INTRAVENOUS at 07:13:00

## 2024-11-26 RX ADMIN — LIDOCAINE HYDROCHLORIDE 10 ML: 10 INJECTION, SOLUTION EPIDURAL; INFILTRATION; INTRACAUDAL; PERINEURAL at 07:12:00

## 2024-11-26 RX ADMIN — ONDANSETRON 4 MG: 2 INJECTION INTRAMUSCULAR; INTRAVENOUS at 08:00:00

## 2024-11-26 RX ADMIN — KETAMINE HYDROCHLORIDE 10 MG: 50 INJECTION, SOLUTION INTRAMUSCULAR; INTRAVENOUS at 07:40:00

## 2024-11-26 RX ADMIN — DEXAMETHASONE SODIUM PHOSPHATE 4 MG: 4 MG/ML VIAL (ML) INJECTION at 07:20:00

## 2024-11-26 RX ADMIN — KETAMINE HYDROCHLORIDE 10 MG: 50 INJECTION, SOLUTION INTRAMUSCULAR; INTRAVENOUS at 07:53:00

## 2024-11-26 NOTE — ADDENDUM NOTE
Addendum  created 11/26/24 0958 by Luis Fernando Lehman MD    Child order released for a procedure order, Clinical Note Signed, Intraprocedure Blocks edited, SmartForm saved

## 2024-11-26 NOTE — ADDENDUM NOTE
Addendum  created 11/26/24 1005 by Luis Fernando Lehman MD    Clinical Note Signed, Intraprocedure Blocks edited

## 2024-11-26 NOTE — ANESTHESIA PROCEDURE NOTES
Regional Block    Date/Time: 11/26/2024 9:57 AM    Performed by: Luis Fernando Lehman MD  Authorized by: Luis Fernando Lehman MD      General Information and Staff    Start Time:  11/26/2024 9:57 AM  End Time:  11/26/2024 9:50 AM  Anesthesiologist:  Luis Fernando Lehman MD  Performed by:  Anesthesiologist  Patient Location:  PACU      Site Identification: real time ultrasound guided and image stored and retrievable    Block site/laterality marked before start: site marked  Reason for Block: at surgeon's request and post-op pain management    Preanesthetic Checklist: 2 patient identifers, IV checked, risks and benefits discussed, monitors and equipment checked, pre-op evaluation, timeout performed, anesthesia consent, sterile technique used, no prohibitive neurological deficits and no local skin infection at insertion site      Procedure Details    Patient Position:  Supine  Prep: ChloraPrep    Monitoring:  Cardiac monitor, continuous pulse ox and blood pressure cuff  Block Type:  Adductor canal  Laterality:  Left  Injection Technique:  Single-shot    Needle    Needle Type:  Short-bevel and echogenic  Needle Gauge:  21 G  Needle Length:  90 mm  Needle Localization:  Ultrasound guidance  Reason for Ultrasound Use: appropriate spread of the medication was noted in real time and no ultrasound evidence of intravascular and/or intraneural injection            Assessment    Injection Assessment:  Good spread noted, negative resistance, negative aspiration for heme, incremental injection and low pressure  Paresthesia Pain:  None  Heart Rate Change: No    - Patient tolerated block procedure well without evidence of immediate block related complications.     Medications  11/26/2024 9:57 AM      Additional Comments    Medication:  Ropivacaine 0.5% 30mL

## 2024-11-26 NOTE — OPERATIVE REPORT
OPERATIVE REPORT  ATTENDING SURGEON: SUGAR SOW MD  ASSISTANT(S): Dom Barry (AJ)  STATEMENT OF MEDICAL NECESSITY  The aid of assistant Dom Barry (AJ) was needed for patient positioning, preparation, drape, retraction,  wound closure, dressing application and critical portions of the procedure.   PRELIMINARY DIAGNOSIS:  1.  Right Medial Meniscus Tear  2.  Right Medial Compartment and Trochlear Chondromalacia     POSTOPERATIVE DIAGNOSIS:  1.  Right Medial Meniscus Tear  2.  Right Medial Compartment and Trochlear Chondromalacia  3.  Right Knee synovitis involving multiple compartments    THE OPERATION:  1.  Right Knee Arthroscopy, Partial Medial Meniscectomy (24696)  2.  Right Knee arthroscopic extensive debridement and synovectomy involving multiple compartments (64516)   3.  Right Knee Arthroscopic Abrasionplasty of Medial Femoral Condyle (81697)      ANESTHESIA: General Endotracheal  ANESTHESIOLOGIST:  MD Fallon  ANTIBIOTICS: Cefazolin 3g within 60 minutes of surgical incision.    IMPLANTS:   None  PATHOLOGY/CULTURES: None  ESTIMATED BLOOD LOSS: Blood Output: 5 mL (11/26/2024  7:59 AM)    DRAINS: None  COMPLICATIONS: No intraoperative nor immediate postoperative complications noted.  COUNTS: All sponge and needle counts were correct at the conclusion of the procedure.  TOURNIQUET TIME: Not Applicable  OPERATIVE FINDINGS:  Complex medial meniscus tear involving zone B with displaced undersurface flap.  Managed with partial medial meniscectomy and resection approximately 25% of the medial meniscus.  Areas of fraying along the anterior margin of zone 8 managed with additional debridement.  Combination of shaver as well as Coblation device utilized for successful debridement.  Areas of full-thickness articular cartilage wear on the weightbearing medial femoral condyle managed abrasion plasty down to bleeding bone using curved mechanical 4.0 mm shaver.  Areas of loose chondral flaps were excised and  removed with a combination of shaver as well as requirement of biter.  Moderate synovitis within the medial, lateral patellofemoral compartments managed with synovectomy.  Areas of articular cartilage wear in the central aspect of the femoral trochlea managed with debridement to a stable margin.    Indications:  Pardeep is a 49 year old male with history, physical examination, and imaging findings consistent with medial meniscus pathology that has been managed with extensive nonsurgical management.  Physical therapy greater than 3 months, intra-articular corticosteroid and ketorolac injection, activity modification, and anti-inflammatory medications without successful symptomatic resolution.  Operative and nonoperative options were discussed with him in my office and we ultimately agreed that surgery would provide the highest likelihood of symptomatic relief and functional improvement.  The risks and benefits of surgery as well as the postoperative rehabilitation plan were reviewed. He voiced a good understanding of treatment options, risks and benefits, and alternatives to surgery. He was given the opportunity to ask questions, which were all answered to the best of my ability and to his satisfaction. Pardeep wished to proceed.   On the day of surgery, the Pardeep was seen in the preoperative area.  I confirmed his identity by name and birthday. We reviewed and confirmed the surgical consent including laterality.  The surgical site was marked with my initials.  We re-reviewed the risks and benefits of the procedure and I answered all additional questions to his satisfaction.      OPERATIVE REPORT:   Pardeep was taken to the operating room in stable condition.    A clear 1010 drape x 2 was applied to the upper thigh. An adjustable lateral post was utilized. The extremity underwent a prescrub with chlorhexidine and alcohol followed by a chlorhexidine formal preparation.  A preoperative timeout was performed confirming  the correct surgical site, procedure, and preoperative imaging was reviewed.      Exam under anesthesia demonstrated a Stable knee with normal range of motion.      Diagnostic knee arthroscopy was performed with standard anterolateral visualization portal was made utilizing a 15 blade, and an arthroscope was introduced. The medial working portal was established under spinal needle localization and diagnostic arthroscopy was commenced.          Diagnostic arthroscopy revealed:      Suprapatellar Areas of mild loose articular cartilage flaps managed with mobile using shaver.   Patellofemoral Normal Cartilage surface on Patella, grade 2 versus grade 3 central trochlear cartilage wear diffusely.  Managed with debridement to a stable margin and chondroplasty.   Gutters Clear without evidence of loose bodies or osteophytes.   Lateral compartment Grade 1 tibial cartilage  Grade 1 femoral cartilage  Lateral meniscus intact without tearing.  Evidence of slightly larger appearance of lateral meniscus.  Not exactly discoid pathology.   Medial compartment Grade 1-2 tibial cartilage  Grade 3 femoral cartilage, managed with debridement and abrasion plasty down to bleeding bone using curved mechanical 4.0 mm shaver.  Area of approximately 15 x 15 mm.  Complex medial meniscus tear involving zone B with displaced undersurface flap.  Managed with partial medial meniscectomy and resection approximately 25% of the medial meniscus.  Areas of fraying along the anterior margin of zone 8 managed with additional debridement.    No pathologic plica   Ligaments ACL Intact.   PCL intact  Popliteus intact    Other Moderate synovitis involving the medial, lateral and patellofemoral compartments managed with synovectomy using curved mechanical 4.0 mm shaver and coablation device.     Within the medial compartment, approximately 25% of the meniscus volume was extracted utilizing a curved 4.0 mm mechanical shaver.  Peripheral fraying was ablated  with a Coblation device to prevent further propagation of tearing.  Comprehensive partial meniscectomy was performed.  Abrasion plasty of weightbearing medial femoral condyle performed on the bleeding bone using curved mechanical 4.0 mm shaver.  Within the patellofemoral compartment, a 4.0 mm curved mechanical shaver was used to debride chondral surfaces of the patella and trochlea to stable margin.    Extensive synovectomy was performed in all 3 compartments with the aid of 4.0 mm curved mechanical shaver and Coblation to ensure hemostasis.  Adequate hemostasis were noted.  Wound closure was performed with buried 3-0 monocryl and overlying Dermabond and steri strips were applied.    At this point in time approximately 30 cc of Marcaine with epinephrine were utilized to provide local anesthesia.    4 x 4 and Tegaderm, [place] style dressing was applied.  The knee was wrapped in a 6 inch Ace wrap.   The final count prior to closure was correct. The patient tolerated the procedure well without complications.   Pardeep was taken to the recovery room in a stable condition with plan for ambulatory discharge to home. He was provided my postoperative discharge booklet, appropriate home exercises, script for outpatient physical therapy, and a copy of my rehabilitation guidelines.      POST OPERATIVE PLAN:  Activity Precautions: WBAT / ROMAT. To begin physical therapy ASAP.   DVT Prophylaxis: Not indicated as patient is ambulatory.   Follow-up Visit: POD #7-14        Ange Vasquez MD  Knee, Shoulder, & Elbow Surgery / Sports Medicine Specialist  Orthopaedic Surgery  [address] [place] [address] [id number] [place] [address].org  [email or web address]  t: [phone]  o: [phone]  f: [phone]    This note was dictated using Dragon software.  While it was briefly proofread prior to completion, some grammatical, spelling, and word choice errors due to dictation may still occur.

## 2024-11-26 NOTE — ANESTHESIA POSTPROCEDURE EVALUATION
[facility]    Pardeep Sims Patient Status:  Hospital Outpatient Surgery   Age/Gender 49 year old male MRN [id number]   Location [facility] POST ANESTHESIA CARE UNIT Attending Ange Vasquez MD   Hosp Day # 0 PCP Shu Mckeon MD       Anesthesia Post-op Note    Left Knee Arthroscopy Partial Medial Meniscectomy Synovectomy Abrasionplasty    Procedure Summary       Date: 11/26/24 Room / Location:  MAIN OR 05 /  MAIN OR    Anesthesia Start: 0705 Anesthesia Stop: 0821    Procedure: Left Knee Arthroscopy Partial Medial Meniscectomy Synovectomy Abrasionplasty (Left: Knee) Diagnosis:       Complex tear of medial meniscus of left knee as current injury, initial encounter      (Complex tear of medial meniscus of left knee as current injury, initial encounter [S83.232A])    Surgeons: Ange Vasquez MD Anesthesiologist: Luis Fernando Lehman MD    Anesthesia Type: general ASA Status: 3            Anesthesia Type: general    Vitals Value Taken Time   /98 11/26/24 0818   Temp 98.1 11/26/24 0821   Pulse 84 11/26/24 0820   Resp 11 11/26/24 0820   SpO2 97 % 11/26/24 0820   Vitals shown include unfiled device data.    Patient Location: PACU    Anesthesia Type: general    Airway Patency: patent    Postop Pain Control: adequate    Mental Status: preanesthetic baseline    Nausea/Vomiting: none    Cardiopulmonary/Hydration status: stable euvolemic    Complications: no apparent anesthesia related complications    Postop vital signs: stable    Dental Exam: Unchanged from Preop    Patient to be discharged from PACU when criteria met.

## 2024-11-26 NOTE — ANESTHESIA PREPROCEDURE EVALUATION
PRE-OP EVALUATION    Patient Name: Pardeep Sims    Admit Diagnosis: Complex tear of medial meniscus of left knee as current injury, initial encounter [S83.232A]    Pre-op Diagnosis: Complex tear of medial meniscus of left knee as current injury, initial encounter [S83.232A]    Left Knee Arthroscopy Partial Medial Meniscectomy Synovectomy Abrasionplasty    Anesthesia Procedure: Left Knee Arthroscopy Partial Medial Meniscectomy Synovectomy Abrasionplasty (Left)    Surgeons and Role:     * Ange Vasquez MD - Primary    Pre-op vitals reviewed.  Temp: 97.9 °F (36.6 °C)  Pulse: 68  Resp: 16  BP: 133/80  SpO2: 99 %  Body mass index is 46.07 kg/m².    Current medications reviewed.  Hospital Medications:   acetaminophen (Tylenol Extra Strength) tab 1,000 mg  1,000 mg Oral Once    scopolamine (Transderm-Scop) 1 MG/3DAYS patch 1 patch  1 patch Transdermal Once    lactated ringers infusion   Intravenous Continuous    ceFAZolin (Ancef) 3 g in sodium chloride 0.9% 100mL IVPB premix  3 g Intravenous Once    ceFAZolin in sodium chloride 0.9% (Ancef) 3-0.9 GM/100ML-% IVPB premix           Outpatient Medications:   Prescriptions Prior to Admission[1]    Allergies: Patient has no known allergies.      Anesthesia Evaluation        Anesthetic Complications  (-) history of anesthetic complications         GI/Hepatic/Renal    Negative GI/hepatic/renal ROS.                             Cardiovascular        Exercise tolerance: good     MET: >4      (+) hypertension                                     Endo/Other    Negative endo/other ROS.                              Pulmonary    Negative pulmonary ROS.                       Neuro/Psych  Comment: Chronic pain/narcotic abuse on suboxone                                    Past Surgical History:   Procedure Laterality Date    Cholecystectomy  10 years ago    Vasectomy  10 years ago     Social History     Socioeconomic History    Marital status:    Tobacco Use    Smoking status:  Never    Smokeless tobacco: Never   Vaping Use    Vaping status: Never Used   Substance and Sexual Activity    Alcohol use: Not Currently    Drug use: Not Currently     Types: Oxycodone     Comment: Addicted to pain medication post operatively with bariatric surgery.    Sexual activity: Yes     Partners: Female   Other Topics Concern     Service No    Weight Concern Yes    Exercise No     History   Drug Use Unknown     Comment: Addicted to pain medication post operatively with bariatric surgery.     Available pre-op labs reviewed.  Lab Results   Component Value Date    WBC 7.9 11/19/2024    RBC 4.08 (L) 11/19/2024    HGB 12.5 (L) 11/19/2024    HCT 36.0 (L) 11/19/2024    MCV 88.2 11/19/2024    MCH 30.6 11/19/2024    MCHC 34.7 11/19/2024    RDW 13.3 11/19/2024    .0 11/19/2024     Lab Results   Component Value Date     11/19/2024    K 4.2 11/19/2024     11/19/2024    CO2 27.0 11/19/2024    BUN 15 11/19/2024    CREATSERUM 0.79 11/19/2024     (H) 11/19/2024    CA 9.3 11/19/2024            Airway      Mallampati: III  Mouth opening: >3 FB  TM distance: > 6 cm  Neck ROM: full Cardiovascular    Cardiovascular exam normal.         Dental      Dental appliance(s): veneers       Pulmonary    Pulmonary exam normal.                 Other findings              ASA: 3   Plan: general  NPO status verified and     Post-procedure pain management plan discussed with surgeon and patient.  Surgeon requests: regional block    Plan/risks discussed with: patient                Present on Admission:  **None**             [1]   Facility-Administered Medications Prior to Admission   Medication Dose Route Frequency Provider Last Rate Last Admin    [COMPLETED] triamcinolone acetonide (Kenalog-40) 40 MG/ML injection 40 mg  40 mg Intra-articular Once Ange Vasquez MD   40 mg at 10/24/24 1218    [COMPLETED] triamcinolone acetonide (Kenalog-40) 40 MG/ML injection 40 mg  40 mg Intra-articular Once Ange Vasquez  MD   40 mg at 10/24/24 1218    [COMPLETED] ketorolac (Toradol) 30 MG/ML injection 30 mg  30 mg Intramuscular Once Ange Vasquez MD   30 mg at 10/24/24 1144     Medications Prior to Admission   Medication Sig Dispense Refill Last Dose/Taking    Tirzepatide-Weight Management (ZEPBOUND) 5 MG/0.5ML Subcutaneous Solution Auto-injector Inject 5 mg into the skin once a week for 4 doses. 2 mL 0 11/12/2024    lisinopril 10 MG Oral Tab Take 1 tablet (10 mg total) by mouth daily. 90 tablet 1 Taking    Cholecalciferol (VITAMIN D3) 1.25 MG (45200 UT) Oral Cap Take by mouth.   Past Month    buprenorphine-naloxone 8-2 MG Sublingual Film Place 1 Film under the tongue in the morning and 1 Film before bedtime. 4 X daily per Dr Sheriff office 11/12/24.   11/25/2024 at  8:00 AM    ALPRAZolam (XANAX) 0.5 MG Oral Tab Take 1 tablet (0.5 mg total) by mouth 2 (two) times daily as needed for Anxiety (anxiety with MRI.). 2 tablet 0 More than a month    ferrous sulfate 325 (65 FE) MG Oral Tab EC Take 1 tablet (325 mg total) by mouth daily with breakfast.   More than a month

## 2024-11-26 NOTE — ANESTHESIA PROCEDURE NOTES
Airway  Date/Time: 11/26/2024 7:16 AM  Urgency: elective      General Information and Staff    Patient location during procedure: OR  Anesthesiologist: Luis Fernando Lehman MD  Performed: anesthesiologist   Performed by: Luis Fernando Lehman MD  Authorized by: Luis Fernando Lehman MD      Indications and Patient Condition  Indications for airway management: anesthesia  Sedation level: deep  Preoxygenated: yes  Patient position: sniffing  Mask difficulty assessment: 2 - vent by mask + OA or adjuvant +/- NMBA    Final Airway Details  Final airway type: endotracheal airway      Successful airway: ETT  Cuffed: yes   Successful intubation technique: Video laryngoscopy  Endotracheal tube insertion site: oral  Blade: GlideScope  Blade size: #4  ETT size (mm): 7.5    Cormack-Lehane Classification: grade I - full view of glottis  Placement verified by: capnometry   Measured from: lips  ETT to lips (cm): 22  Number of attempts at approach: 1

## 2024-12-04 ENCOUNTER — OFFICE VISIT (OUTPATIENT)
Dept: ORTHOPEDICS CLINIC | Facility: CLINIC | Age: 49
End: 2024-12-04
Payer: COMMERCIAL

## 2024-12-04 DIAGNOSIS — Z98.890 S/P ARTHROSCOPY OF KNEE: Primary | ICD-10-CM

## 2024-12-04 PROCEDURE — 99024 POSTOP FOLLOW-UP VISIT: CPT | Performed by: PHYSICIAN ASSISTANT

## 2024-12-04 NOTE — PROGRESS NOTES
[facility]  [address] [place] [id number]  [phone]       Name: Pardeep Sims   MRN: [id number]  Date: 12/4/2024     REASON FOR VISIT: First Post-Surgical Visit   Surgery: Right knee arthroscopy partial medial meniscectomy, extensive debridement synovectomy, abrasion plasty of the medial femoral condyle on November 26, 2024.    INTERVAL HISTORY:  Pardeep Sims is a 49 year old male who returns after the aforementioned procedure.  The post-operative course has been unremarkable with pain well controlled and overall progress noted.     Physical therapy was started and is progressing well.  The patient denies any calf pain or tenderness, fevers, chills, sweats or signs of active infection. The patient has been compliant with the postoperative protocol, and admits to normal bowel and bladder function. No acute issues.     ROS: ROS    PE:   There were no vitals filed for this visit.  Estimated body mass index is 46.07 kg/m² as calculated from the following:    Height as of 11/16/24: 5' 8\" (1.727 m).    Weight as of 11/26/24: 303 lb (137.4 kg).    Physical Exam  Constitutional:       Appearance: Normal appearance.   HENT:      Head: Normocephalic and atraumatic.   Eyes:      Extraocular Movements: Extraocular movements intact.   Neck:      Musculoskeletal: Normal range of motion and neck supple.   Cardiovascular:      Pulses: Normal pulses.   Pulmonary:      Effort: Pulmonary effort is normal. No respiratory distress.   Abdominal:      General: There is no distension.   Skin:     General: Skin is warm.      Capillary Refill: Capillary refill takes less than 2 seconds.      Findings: No bruising.   Neurological:      General: No focal deficit present.      Mental Status: She is alert.   Psychiatric:         Mood and Affect: Mood normal.     Examination of the right knee demonstrates:     Physical examination the patient is alert and oriented x3, well-developed,  well-nourished, no acute distress.     Tegaderm dressings were removed, and Steri-Strips were maintained and kept in place. Full ROM.     Incisional sites are clean dry intact without signs of active pathology.  Calf is soft and nontender to palpation.    The contralateral knee is without limitation in range of motion or strength, no positive provocative maneuvers.       IMPRESSION: Pardeep Sims is a 49 year old male who presents 8 days s/p Right knee arthroscopy partial medial meniscectomy, extensive debridement synovectomy, abrasion plasty of the medial femoral condyle on November 26, 2024.    PLAN:   We had a lengthy discussion with the patient regarding the patient's findings consistent with the expected postoperative course. We recommend continuation of physical therapy with rehabilitation efforts focused on strengthening, range of motion, functional ability, and return to baseline activity. The patient can continue to progress per protocol.    All questions were answered appropriately and the patient was in agreement with the treatment plan.       FOLLOW-UP:  Return to clinic on an as needed basis.             Abhishek Soto, [facility], PA-C Orthopedic Surgery / Sports Medicine Specialist  [facility] Orthopaedic Surgery  [address] [place] [address].org  [email or web address]  t: [phone]  o: [phone]  f: [phone]    This note was dictated using Dragon software.  While it was briefly proofread prior to completion, some grammatical, spelling, and word choice errors due to dictation may still occur.

## 2024-12-11 ENCOUNTER — TELEMEDICINE (OUTPATIENT)
Dept: FAMILY MEDICINE CLINIC | Facility: CLINIC | Age: 49
End: 2024-12-11
Payer: COMMERCIAL

## 2024-12-11 DIAGNOSIS — Z87.440 HX: UTI (URINARY TRACT INFECTION): Primary | ICD-10-CM

## 2024-12-11 DIAGNOSIS — E66.813 CLASS 3 SEVERE OBESITY DUE TO EXCESS CALORIES WITH SERIOUS COMORBIDITY AND BODY MASS INDEX (BMI) OF 45.0 TO 49.9 IN ADULT (HCC): ICD-10-CM

## 2024-12-11 DIAGNOSIS — E66.01 CLASS 3 SEVERE OBESITY DUE TO EXCESS CALORIES WITH SERIOUS COMORBIDITY AND BODY MASS INDEX (BMI) OF 45.0 TO 49.9 IN ADULT (HCC): ICD-10-CM

## 2024-12-11 PROCEDURE — 99214 OFFICE O/P EST MOD 30 MIN: CPT | Performed by: FAMILY MEDICINE

## 2024-12-11 RX ORDER — TIRZEPATIDE 7.5 MG/.5ML
7.5 INJECTION, SOLUTION SUBCUTANEOUS WEEKLY
Qty: 2 ML | Refills: 0 | Status: SHIPPED | OUTPATIENT
Start: 2024-12-11 | End: 2025-01-02

## 2024-12-11 NOTE — PROGRESS NOTES
This visit is conducted using Telemedicine with live, interactive video and audio.    Patient has been referred to the [facility] website at [email or web address] to review the yearly Consent to Treat document.    Patient understands and accepts financial responsibility for any deductible, co-insurance and/or co-pays associated with this service.           CHIEF COMPLAINT:   Medication refill, hx of chronic UTI needs referral to urology previous one .     HPI:   Pardeep Sims is a 49 year old male who presents for a video visit.  Patient reports that he needs a refill on his zepbound medication and would like to increase the dosage to 7.5 mg. Patient states that he is feeling the urge to eat more often again. Patient states that the previous doses worked very well with minimal side effects. Patient states that he has had 3 utis in the last year and was referred to a urologist but did not go. No active uti symptoms at this time, but would like a referral for urology.    Current Outpatient Medications   Medication Sig Dispense Refill    Tirzepatide-Weight Management (ZEPBOUND) 7.5 MG/0.5ML Subcutaneous Solution Auto-injector Inject 7.5 mg into the skin once a week for 4 doses. 2 mL 0    Sennosides-Docusate Sodium 8.6-50 MG Oral Cap Take 1 capsule by mouth daily as needed. 30 capsule 1    ondansetron (ZOFRAN) 4 mg tablet Take 1 tablet by mouth every 8 hours as needed for nausea. 8 tablet 0    Meloxicam 15 MG Oral Tab Take 1 tablet (15 mg total) by mouth daily. 14 tablet 1    Naloxone HCl 4 MG/0.1ML Nasal Liquid 4 mg by Nasal route as needed. If patient remains unresponsive, repeat dose in other nostril 2-5 minutes after first dose. 1 kit 0    ALPRAZolam (XANAX) 0.5 MG Oral Tab Take 1 tablet (0.5 mg total) by mouth 2 (two) times daily as needed for Anxiety (anxiety with MRI.). 2 tablet 0    lisinopril 10 MG Oral Tab Take 1 tablet (10 mg total) by mouth daily. 90 tablet 1    ferrous sulfate 325 (65 FE) MG Oral  Tab EC Take 1 tablet (325 mg total) by mouth daily with breakfast.      Cholecalciferol (VITAMIN D3) 1.25 MG (43427 UT) Oral Cap Take by mouth.      buprenorphine-naloxone 8-2 MG Sublingual Film Place 1 Film under the tongue in the morning and 1 Film before bedtime. 4 X daily per Dr Sheriff office 11/12/24.        Past Medical History:    High blood pressure    Osteoarthritis    bilateral knee      Past Surgical History:   Procedure Laterality Date    Cholecystectomy  10 years ago    Other surgical history Left 11/26/2024    Left Knee Arthroscopy Partial Medial Meniscectomy Synovectomy Abrasionplasty    Vasectomy  10 years ago         Social History     Socioeconomic History    Marital status:    Tobacco Use    Smoking status: Never    Smokeless tobacco: Never   Vaping Use    Vaping status: Never Used   Substance and Sexual Activity    Alcohol use: Not Currently    Drug use: Not Currently     Types: Oxycodone     Comment: Addicted to pain medication post operatively with bariatric surgery.    Sexual activity: Yes     Partners: Female   Other Topics Concern     Service No    Weight Concern Yes    Exercise No         REVIEW OF SYSTEMS:   GENERAL: Normal appetite  SKIN: no rashes or abnormal skin lesions  HEENT: Normal  LUNGS: denies shortness of breath or wheezing,   CARDIOVASCULAR: denies chest pain or palpitations   GI: denies N/V/C or abdominal pain  NEURO: Denies headaches    EXAM:   General: Alert  Respiratory:   Speaking in full sentences comfortably  Normal work of breathing  No cough during visit  Head: Normocephalic  Nose: No obvious nasal discharge.  Skin: No obvious rashes or lesions from what observed.     No results found for this or any previous visit (from the past 24 hours).    ASSESSMENT AND PLAN:   Pardeep Sims is a 49 year old male who presents with symptoms that are consistent with    ASSESSMENT:   Encounter Diagnoses   Name Primary?    Hx: UTI (urinary tract infection) Yes     Class 3 severe obesity due to excess calories with serious comorbidity and body mass index (BMI) of 45.0 to 49.9 in adult (HCC)        PLAN: Meds as below.  See patient Instructions          Procedures    Urology Referral - In Network        Meds & Refills for this Visit:  Requested Prescriptions     Signed Prescriptions Disp Refills    Tirzepatide-Weight Management (ZEPBOUND) 7.5 MG/0.5ML Subcutaneous Solution Auto-injector 2 mL 0     Sig: Inject 7.5 mg into the skin once a week for 4 doses.       Risks, benefits, and side effects of medication explained and discussed.    The patient indicates understanding of these issues and agrees to the plan.  The patient is asked to return if sx's persist or worsen.    Face to face time spent on Video Visit: 10  Total Time spent on visit including reviewing history, ordering labs/medication, patient examination and education: 10    Pardeep Sims understands video visit evaluation is not a substitute for face-to-face examination or emergency care. Patient advised to go to ER or call 911 for worsening symptoms or acute distress.

## 2024-12-18 ENCOUNTER — TELEPHONE (OUTPATIENT)
Dept: ORTHOPEDICS CLINIC | Facility: CLINIC | Age: 49
End: 2024-12-18

## 2024-12-18 ENCOUNTER — OFFICE VISIT (OUTPATIENT)
Dept: ORTHOPEDICS CLINIC | Facility: CLINIC | Age: 49
End: 2024-12-18
Payer: COMMERCIAL

## 2024-12-18 DIAGNOSIS — S83.231A COMPLEX TEAR OF MEDIAL MENISCUS OF RIGHT KNEE AS CURRENT INJURY, INITIAL ENCOUNTER: Primary | ICD-10-CM

## 2024-12-18 DIAGNOSIS — M94.261 CHONDROMALACIA OF RIGHT KNEE: ICD-10-CM

## 2024-12-18 DIAGNOSIS — M65.969 SYNOVITIS OF KNEE: ICD-10-CM

## 2024-12-18 PROCEDURE — 99215 OFFICE O/P EST HI 40 MIN: CPT | Performed by: ORTHOPAEDIC SURGERY

## 2024-12-18 NOTE — PROGRESS NOTES
OR BOOKING SHEET KNEE ARTHROSCOPY  Location: [x] [facility]   [] [facility]  Name: Pardeep Sims  MRN: [id number]   : 1975  Diagnosis:  [x] Complex tear of medial meniscus of right knee as current injury, initial encounter [S83.122A]  Disposition:    [x] Ambulatory  Operative Time Required: 60 minutes ([facility])  Procedure:  Antibiotics: 2 g cefazolin within 60 minutes of surgical incision (3 g if > 120 kg)  Laterality: [x] RIGHT  [] LEFT                       [] BILATERAL  Procedures:   [x] Knee Arthroscopy     [x] Partial Medial Meniscectomy (46545)   [] Partial Lateral Meniscectomy (58380)                    [] Partial Medial and Lateral Meniscectomy (95457)     [] Lateral Meniscus Repair (63672)                    [] Medial Meniscus Repair (22856)     [x] Synovectomy (53727)   [x] Abrasionplasty (58127)     [] Partial Medial Meniscectomy vs Medial Meniscus Repair (05496 vs 14669)   [] Partial Lateral Meniscectomy vs Lateral Meniscus Repair (21329 vs 17250)   [] Irrigation & Debridement (22731)   [] Manipulation Under Anesthesia (93259)   [] Chondroplasty (16145)   [] Removal of Loose Body (06461)    Injections:   [] Stem cells from bone marrow aspiration (56594)    From:  [] Left Iliac crest  [] Right Iliac crest    To:  [] Left knee  [] Right knee  [] Other     Additional info:   [] PCP Clearance Needed  [] MRSA  [x] Physical Therapy External - [facility]  [] DME Rx  [] Appt with Dr. Vasquez needed  Implants needed: None  Positioning Equipment: Supine with Lateral Post

## 2024-12-18 NOTE — H&P
Orthopaedic Surgery  [address] [place] [id number]  [phone]     PRE SURGICAL - HISTORY AND PHYSICAL EXAMINATION     Name: Padreep Sims   MRN: [id number]  Date: 12/18/2024     CC: Right Knee Pain and mechanical symptoms    REFERRED BY: Shu Mckeon MD    HPI:   Pardeep Sims is a very pleasant 49 year old male who presents today for evaluation of Right knee pain and mechanical symptoms and recent completion of MRI demonstrating meniscal pathology.     To summarize: right knee pain onset end of August 2023. At that time, he was on a weighted backpack hike when the knee pain developed. This worsens with bending, and is associated with weakness. MRI of the right knee demonstrates a medial meniscus tear.     We have been managing conservatively with physical therapy and 3 right knee steroid injections on 9/29/23 + 11/20/23 + 10/23/24.     He recently underwent left knee arthroscopy partial medial meniscectomy, extensive debridement synovectomy, abrasion plasty of the medial femoral condyle on November 26, 2024. He is seeking a similar surgical procedure for the right knee.    PMH:   Past Medical History:    High blood pressure    Osteoarthritis    bilateral knee       PAST SURGICAL HX:  Past Surgical History:   Procedure Laterality Date    Cholecystectomy  10 years ago    Other surgical history Left 11/26/2024    Left Knee Arthroscopy Partial Medial Meniscectomy Synovectomy Abrasionplasty    Vasectomy  10 years ago       FAMILY HX:  History reviewed. No pertinent family history.    ALLERGIES:  Patient has no known allergies.    MEDICATIONS:   Current Outpatient Medications   Medication Sig Dispense Refill    Tirzepatide-Weight Management (ZEPBOUND) 7.5 MG/0.5ML Subcutaneous Solution Auto-injector Inject 7.5 mg into the skin once a week for 4 doses. 2 mL 0    Naloxone HCl 4 MG/0.1ML Nasal Liquid 4 mg by Nasal route as needed. If patient remains unresponsive, repeat dose in other nostril 2-5  minutes after first dose. 1 kit 0    lisinopril 10 MG Oral Tab Take 1 tablet (10 mg total) by mouth daily. 90 tablet 1    ferrous sulfate 325 (65 FE) MG Oral Tab EC Take 1 tablet (325 mg total) by mouth daily with breakfast.      Cholecalciferol (VITAMIN D3) 1.25 MG (97729 UT) Oral Cap Take by mouth.      buprenorphine-naloxone 8-2 MG Sublingual Film Place 1 Film under the tongue in the morning and 1 Film before bedtime. 4 X daily per Dr Sheriff office 11/12/24.      ALPRAZolam (XANAX) 0.5 MG Oral Tab Take 1 tablet (0.5 mg total) by mouth 2 (two) times daily as needed for Anxiety (anxiety with MRI.). 2 tablet 0       ROS: A comprehensive 14 point review of systems was performed and was negative aside from the aforementioned per history of present illness.    SOCIAL HX:  Social History     Tobacco Use    Smoking status: Never    Smokeless tobacco: Never   Substance Use Topics    Alcohol use: Not Currently       PE:   There were no vitals filed for this visit.  Estimated body mass index is 46.07 kg/m² as calculated from the following:    Height as of 11/16/24: 5' 8\" (1.727 m).    Weight as of 11/26/24: 303 lb (137.4 kg).    Physical Exam  Constitutional:       Appearance: Normal appearance.   HENT:      Head: Normocephalic and atraumatic.   Eyes:      Extraocular Movements: Extraocular movements intact.   Neck:      Musculoskeletal: Normal range of motion and neck supple.   Cardiovascular:      Pulses: Normal pulses.   Pulmonary:      Effort: Pulmonary effort is normal. No respiratory distress.   Abdominal:      General: There is no distension.   Skin:     General: Skin is warm.      Capillary Refill: Capillary refill takes less than 2 seconds.      Findings: No bruising.   Neurological:      General: No focal deficit present.      Mental Status: Alert.   Psychiatric:         Mood and Affect: Mood normal.     Examination of the right knee demonstrates:     Skin is intact, warm and dry.   Atrophy: mild    Effusion:  small    Joint line tenderness: medial  Crepitation: none   Michele: Positive   Patellar mobility: normal without apprehension  J-sign: none    ROM: Extension lacking less than 5 degrees  Flexion 120 degrees  ACL:  Negative Lachman, Negative Pivot Shift   PCL:  Negative Posterior Drawer  Collateral Ligaments: Stable to Varus and Valgus stress at 0 and 30 degrees  Strength: mild weakness   Hip joint: normal pain-free ROM   Gait:  mildly antalgic   Leg length: equal and symmetric  Alignment:  neutral     No obvious peripheral edema noted.   Distal neurovascular exam demonstrates normal perfusion, intact sensation to light touch and full strength.     Examination of the contralateral knee demonstrates:  No significant atrophy, swelling or effusion. Full range of motion. Neurovascularly intact distally.    Radiographic Examination/Diagnostics: MRI of the knee personally viewed, independently interpreted and radiology report was reviewed.    MRI Right Knee, 12/05/2023:   IMPRESSION:   1.Multidirectional tearing and maceration of the body of the medial meniscus which is extruded from the joint space.   2.Medial tibial femoral joint demonstrates severe cartilage degeneration with diffuse full-thickness cartilage thinning.   3.Large joint effusion with multiple intra-articular bodies.   4.High-grade sprain and partial-thickness tearing of the medial collateral ligament.   5.Fluid extending along the myofascial planes of the calf which may be secondary to rupture or leak of popliteal cyst.     IMPRESSION: Pardeep Sims is a 49 year old male with right Medial Meniscus Tear, chondromalacia and synovitis sustained end of August 2023 after carrying a weighted backpack.  They have failed extensive conservative treatment including Physical therapy greater than 6 weeks, intra-articular knee corticosteroid injection, oral anti-inflammatory medications, and activity modification.     Consequently, they are an appropriate  candidate for knee arthroscopy, partial meniscectomy, abrasionplasty and extensive debridement/synovectomy.    PLAN:   I had a lengthy discussion with Pardeep about the diagnosis and options, both surgical and nonsurgical. I have recommended that we proceed with arthroscopic surgical treatment as we agree that this intervention will likely offer the best opportunity for symptomatic relief and functional recovery. I used the MRI scan and a 3-dimensional knee model to outline his pathology, as well as general surgical principles. We reviewed the risks associated with arthroscopic partial meniscectomy and debridement / synovectomy.  In particular I emphasized that the displaced flap component and degenerative portion of the meniscus would be removed as this is dysvascular.  Simultaneously, I will perform a diagnostic knee arthroscopy of the knee and hope to identify and address any other pathology that may be encountered such as scar tissue, inflammation, cartilage pathology.  In particular we discussed risks that include, a low risk of infection (<1%), potential transient or permanent injury to nerves with superficial numbness, joint stiffness which may require additional physical therapy, persistent pain/lack of symptomatic improvement, need for future operation, wound complications (rare as incisions are very small), deep vein thrombosis (DVT) and pulmonary embolism (PE).   We discussed the proposed rehabilitation timeline as well as expected postoperative restrictions.  In this regard, I emphasized that there will be no weightbearing or range of motion restrictions post operatively.  Crutches will be provided initially for patient comfort and I will aim to have the patient begin physical therapy on postoperative day 1.  The advantage of this is to begin immediate mobility and range of motion to mitigate pain and encourage reduction of inflammation/swelling.  This will ultimately lead to a quicker postsurgical  rehabilitation.  For most patients, this requires a total of 4 to 6 weeks of postsurgical physical therapy to attain full functional status after simple knee arthroscopy.  Pardeep voiced a good understanding of treatment options, risks and benefits, postoperative instructions, rehabilitation timeline, and restrictions. He was given the opportunity to ask questions, which were all answered to the best of my ability and to his satisfaction. Pardeep will work with my office to arrange a time for surgery and obtain any medical clearance information necessary. My pre-operative information packet, which details the process and answers many FAQ's will be provided. He was encouraged to call the office with any further questions or concerns.  I spent 40 minutes in preparation to see the patient, counseling/education of relevant pathology, discussing imaging results, ordering post surgical physical therapy intervention, surgical counseling, and care coordination.        FOLLOW-UP:  Post-Operative Visit, POD 6 with Abhishek Soto PA-C.         Ange Vasquez MD  Knee, Shoulder, & Elbow Surgery / Sports Medicine Specialist  Orthopaedic Surgery  [address] [place] [email or web address]  [email or web address]  t: [phone]  o: [phone]  f: [phone]    This note was dictated using Dragon software.  While it was briefly proofread prior to completion, some grammatical, spelling, and word choice errors due to dictation may still occur.

## 2024-12-18 NOTE — TELEPHONE ENCOUNTER
Date of Surgery: 2025       Post Op Appt:  [phone]    Case ID: [id number]     Notes: Lets please target for 25. Thanks!             OR BOOKING SHEET KNEE ARTHROSCOPY  Location: [x] [facility]  Name: Pardeep Sims  MRN: [id number]   : 1975  Diagnosis:  [x] Complex tear of medial meniscus of right knee as current injury, initial encounter [S83.626A]  Disposition:    [x] Ambulatory  Operative Time Required: 60 minutes ([facility])  Procedure:  Antibiotics: 2 g cefazolin within 60 minutes of surgical incision (3 g if > 120 kg)  Laterality:                  [x] RIGHT                  [] LEFT                          [] BILATERAL  Procedures:                    [x] Knee Arthroscopy                                                   [x] Partial Medial Meniscectomy (77933)                    [] Partial Lateral Meniscectomy (06094)                    [] Partial Medial and Lateral Meniscectomy (38383)                       [] Lateral Meniscus Repair (37948)                    [] Medial Meniscus Repair (39486)                       [x] Synovectomy (02571)                    [x] Abrasionplasty (76096)                       [] Partial Medial Meniscectomy vs Medial Meniscus Repair (35077 vs 34253)                    [] Partial Lateral Meniscectomy vs Lateral Meniscus Repair (09464 vs 80177)                    [] Irrigation & Debridement (09232)                    [] Manipulation Under Anesthesia (47097)                    [] Chondroplasty (10460)                    [] Removal of Loose Body (42602)     Injections:                    [] Stem cells from bone marrow aspiration (44232)                                      From:                   [] Left Iliac crest                   [] Right Iliac crest                                      To:                   [] Left knee         [] Right knee                          [] Other      Additional info:   [] PCP Clearance Needed  [] MRSA  [x]  Physical Therapy External - [facility]  [] DME Rx  [] Appt with Dr. aVsquez needed  Implants needed: None  Positioning Equipment: Supine with Lateral Post

## 2024-12-28 DIAGNOSIS — E66.813 CLASS 3 SEVERE OBESITY DUE TO EXCESS CALORIES WITH SERIOUS COMORBIDITY AND BODY MASS INDEX (BMI) OF 45.0 TO 49.9 IN ADULT (HCC): ICD-10-CM

## 2024-12-28 DIAGNOSIS — E66.01 CLASS 3 SEVERE OBESITY DUE TO EXCESS CALORIES WITH SERIOUS COMORBIDITY AND BODY MASS INDEX (BMI) OF 45.0 TO 49.9 IN ADULT (HCC): ICD-10-CM

## 2024-12-30 RX ORDER — TIRZEPATIDE 7.5 MG/.5ML
7.5 INJECTION, SOLUTION SUBCUTANEOUS WEEKLY
Qty: 2 ML | Refills: 0 | OUTPATIENT
Start: 2024-12-30

## 2025-01-14 ENCOUNTER — LABORATORY ENCOUNTER (OUTPATIENT)
Dept: LAB | Age: 50
End: 2025-01-14
Attending: ORTHOPAEDIC SURGERY
Payer: COMMERCIAL

## 2025-01-14 ENCOUNTER — TELEMEDICINE (OUTPATIENT)
Dept: FAMILY MEDICINE CLINIC | Facility: CLINIC | Age: 50
End: 2025-01-14
Payer: COMMERCIAL

## 2025-01-14 DIAGNOSIS — S83.231A COMPLEX TEAR OF MEDIAL MENISCUS OF RIGHT KNEE AS CURRENT INJURY, INITIAL ENCOUNTER: ICD-10-CM

## 2025-01-14 DIAGNOSIS — E66.01 CLASS 3 SEVERE OBESITY DUE TO EXCESS CALORIES WITH SERIOUS COMORBIDITY AND BODY MASS INDEX (BMI) OF 45.0 TO 49.9 IN ADULT (HCC): Primary | ICD-10-CM

## 2025-01-14 DIAGNOSIS — E66.813 CLASS 3 SEVERE OBESITY DUE TO EXCESS CALORIES WITH SERIOUS COMORBIDITY AND BODY MASS INDEX (BMI) OF 45.0 TO 49.9 IN ADULT (HCC): Primary | ICD-10-CM

## 2025-01-14 DIAGNOSIS — Z87.440 HX: UTI (URINARY TRACT INFECTION): ICD-10-CM

## 2025-01-14 LAB
CHLORIDE SERPL-SCNC: 110 MMOL/L (ref 98–112)
CO2 SERPL-SCNC: 24 MMOL/L (ref 21–32)
POTASSIUM SERPL-SCNC: 4 MMOL/L (ref 3.5–5.1)
SODIUM SERPL-SCNC: 137 MMOL/L (ref 136–145)

## 2025-01-14 PROCEDURE — 36415 COLL VENOUS BLD VENIPUNCTURE: CPT

## 2025-01-14 PROCEDURE — 80051 ELECTROLYTE PANEL: CPT

## 2025-01-14 PROCEDURE — 98006 SYNCH AUDIO-VIDEO EST MOD 30: CPT | Performed by: FAMILY MEDICINE

## 2025-01-14 RX ORDER — TIRZEPATIDE 10 MG/.5ML
10 INJECTION, SOLUTION SUBCUTANEOUS WEEKLY
Qty: 2 ML | Refills: 0 | Status: SHIPPED | OUTPATIENT
Start: 2025-01-14 | End: 2025-02-05

## 2025-01-14 NOTE — PROGRESS NOTES
This visit is conducted using Telemedicine with live, interactive video and audio.    Patient has been referred to the [facility] website at [email or web address] to review the yearly Consent to Treat document.    Patient understands and accepts financial responsibility for any deductible, co-insurance and/or co-pays associated with this service.    CHIEF COMPLAINT:   Medication Refill and referral to urology.     HPI:   Pardeep Sims is a 49 year old male who presents for a video visit.  Patient reports weight loss while using the medication.   Patient denies unwanted side effects.  Patient states that he would like a referral for urology a provider had told him in the past that he needed to see a urologist due to having a few UTIs. Patient states that he never followed up.      Current Outpatient Medications   Medication Sig Dispense Refill    Tirzepatide-Weight Management (ZEPBOUND) 10 MG/0.5ML Subcutaneous Solution Auto-injector Inject 10 mg into the skin once a week for 4 doses. 2 mL 0    Naloxone HCl 4 MG/0.1ML Nasal Liquid 4 mg by Nasal route as needed. If patient remains unresponsive, repeat dose in other nostril 2-5 minutes after first dose. 1 kit 0    ALPRAZolam (XANAX) 0.5 MG Oral Tab Take 1 tablet (0.5 mg total) by mouth 2 (two) times daily as needed for Anxiety (anxiety with MRI.). (Patient not taking: Reported on 1/3/2025) 2 tablet 0    lisinopril 10 MG Oral Tab Take 1 tablet (10 mg total) by mouth daily. 90 tablet 1    ferrous sulfate 325 (65 FE) MG Oral Tab EC Take 1 tablet (325 mg total) by mouth daily with breakfast.      Cholecalciferol (VITAMIN D3) 1.25 MG (17362 UT) Oral Cap Take by mouth.      buprenorphine-naloxone 8-2 MG Sublingual Film Place 1 Film under the tongue in the morning and 1 Film before bedtime. 4 X daily per Dr Sheriff office 11/12/24. (Patient taking differently: Place 1 Film under the tongue in the morning and 1 Film before bedtime. 4 X daily per Dr Sheriff office  11/12/24  2MG .)        Past Medical History:    High blood pressure    Osteoarthritis    bilateral knee      Past Surgical History:   Procedure Laterality Date    Cholecystectomy  10 years ago    Other surgical history Left 11/26/2024    Left Knee Arthroscopy Partial Medial Meniscectomy Synovectomy Abrasionplasty    Vasectomy  10 years ago         Social History     Socioeconomic History    Marital status:    Tobacco Use    Smoking status: Never    Smokeless tobacco: Never   Vaping Use    Vaping status: Never Used   Substance and Sexual Activity    Alcohol use: Not Currently    Drug use: Not Currently     Types: Oxycodone     Comment: Addicted to pain medication post operatively with bariatric surgery.    Sexual activity: Yes     Partners: Female   Other Topics Concern     Service No    Weight Concern Yes    Exercise No         REVIEW OF SYSTEMS:   GENERAL: Normal appetite  SKIN: no rashes or abnormal skin lesions  HEENT: Normal  LUNGS: denies shortness of breath or wheezing,   CARDIOVASCULAR: denies chest pain or palpitations   GI: denies N/V/C or abdominal pain  NEURO: Denies headaches    EXAM:   General: Alert  Respiratory:   Speaking in full sentences comfortably  Normal work of breathing  No cough during visit  Head: Normocephalic  Nose: No obvious nasal discharge.  Skin: No obvious rashes or lesions from what observed.     Recent Results (from the past 24 hours)   Electrolyte Panel    Collection Time: 01/14/25 10:15 AM   Result Value Ref Range    Sodium 137 136 - 145 mmol/L    Potassium 4.0 3.5 - 5.1 mmol/L    Chloride 110 98 - 112 mmol/L    CO2 24.0 21.0 - 32.0 mmol/L       ASSESSMENT AND PLAN:   Pardeep Sims is a 49 year old male who presents with symptoms that are consistent with    ASSESSMENT:   Encounter Diagnoses   Name Primary?    Class 3 severe obesity due to excess calories with serious comorbidity and body mass index (BMI) of 45.0 to 49.9 in adult (HCC) Yes    Hx: UTI (urinary  tract infection)        PLAN: Meds as below.  See patient Instructions          Procedures    Urology Referral - In Network        Meds & Refills for this Visit:  Requested Prescriptions     Signed Prescriptions Disp Refills    Tirzepatide-Weight Management (ZEPBOUND) 10 MG/0.5ML Subcutaneous Solution Auto-injector 2 mL 0     Sig: Inject 10 mg into the skin once a week for 4 doses.       Risks, benefits, and side effects of medication explained and discussed.    The patient indicates understanding of these issues and agrees to the plan.  The patient is asked to return if sx's persist or worsen.    Pardeep Sims understands video visit evaluation is not a substitute for face-to-face examination or emergency care. Patient advised to go to ER or call 911 for worsening symptoms or acute distress.

## 2025-01-20 ENCOUNTER — ANESTHESIA EVENT (OUTPATIENT)
Dept: SURGERY | Facility: HOSPITAL | Age: 50
End: 2025-01-20
Payer: COMMERCIAL

## 2025-01-20 RX ORDER — MELOXICAM 15 MG/1
15 TABLET ORAL DAILY
Qty: 14 TABLET | Refills: 1 | Status: SHIPPED | OUTPATIENT
Start: 2025-01-20

## 2025-01-20 RX ORDER — ONDANSETRON 4 MG/1
TABLET, FILM COATED ORAL
Qty: 8 TABLET | Refills: 0 | Status: SHIPPED | OUTPATIENT
Start: 2025-01-20

## 2025-01-20 NOTE — DISCHARGE INSTRUCTIONS
Knee Arthroscopy - Meniscectomy / Debridement  Postoperative Guidelines      This document will help you plan for your post-operative recovery course following surgery. Please read and retain this information for future reference. Many of the questions you may have later can be answered by referring to this information.      DIET   Begin with clear liquids and light foods (jellos, soups, etc.).   Progress to your normal diet if you are not experiencing nausea.       WOUND CARE   Please keep the ACE wrap on for the first 2-3 days, you may remove the ACE wrap for ice therapy.  Underneath the ACE wrap are waterproof bandages, please keep these in place until your first post-op appointment with Dr. Vasquez.  Under the waterproof bandages is Dermabond, this is a surgical glue and tape that is used in conjunction with absorbable stitches to close the incision.   Please do not touch the Dermabond or place any ointments lotions or creams directly over the incisions.  You may shower after removing the ACE wrap by placing Saran wrap around the leg and covering the bandages.  NO soaking of the operative leg (ie: bath or pool) is allowed until 6 weeks after surgery.     CRUTCHES   Crutches are to aid your progression to walking.   You may discontinue the crutches as soon as you are comfortable with full weight on the leg (usually 1-3 days). Your physical therapist may guide you with this process.     PAIN MANAGEMENT  Local numbing medications are injected into the wound around the knee at the time of surgery. These will wear off within 8-12 hours and it is not uncommon for you to encounter more pain on the first or second day after surgery when swelling peaks.   Do not drive a car or operate machinery while taking the narcotic medication.   Please avoid alcohol use while taking the narcotic pain medication.     NON-NARCOTIC PAIN MEDICATIONS   Extra-Strength Tylenol (Acetaminophen) 500mg Tablets (available over the  counter)  Indication: pain, non-narcotic pain reliever  Use: Take 1-2 tablets every 6 hours.  Do not exceed 8 tablets in a 24-hour period.  Mobic (Meloxicam) 15mg Tablets (Prescription sent to pharmacy)  Indication: pain and anti-inflammatory, non-narcotic pain reliever  Use: Take 1 tablets daily with meals for the first 14 days after surgery.  Side Effects: upset stomach, acid reflux.  If this occurs, stop the medication.    NARCOTIC PAIN MEDICATION  Tramadol is a prescription pain medication that should only be used if adequate pain control is not achieved with combination of ice, extra-strength Tylenol and Aleve as outlined above.   Side effects include constipation, nausea, drowsiness, dry mouth, itchiness.  Use a 0-10 pain scale to decide how much medication to take:                                Pain 0-4/10: No tramadol necessary                                Pain 5-7/10: Take one tablet                                 Pain 8-10/10: Take two tablets   Tramadol 50mg tablet  Indication: pain 5/10 or greater.  Narcotic pain medication.  Use: 1-2 tabs every 4-6 hours as needed for pain.  Do not exceed more than 10 tabs in any 24-hour time period unless otherwise directed.    MEDICATIONS TO MANAGE SIDE EFFECTS  Narcotics may cause nausea and constipation. Bowel regimen is recommended.  Colace (Docusate) 100 mg - available OTC  Indication:  constipation, stool softener.  Take consistently while on narcotics.  Use:  Take 1 pill three times per day while you are taking narcotics.    Senokot (Senna) 8.6 mg - available OTC  Indication: constipation, stool laxative.  Take consistently while on narcotics.   Use: Take 2 pills at bedtime.  Increase to 2 pills twice daily if no bowel movement by post-surgery day two.    Zofran (Ondansetron ODT) 4 mg- Prescription sent to pharmacy  Indication: nausea.  Take as needed.  Use: Take 1 pill AS NEEDED every 8 hours, do not exceed 3 pills in 24 hours      ACTIVITY   Raise the  operative leg to chest level whenever possible to decrease swelling.   Do not place pillows under knees (i.e. do not maintain knee in a flexed or bent position), but rather place pillows under the foot/ankle.   Use crutches to assist with walking for the first 24-48 hours  Do not engage in activities which increase knee pain/swelling (prolonged periods of standing or walking) for the first 2 weeks following surgery.   Avoid long periods of sitting (without leg elevated) or long distance traveling for 2 weeks.   NO driving until instructed otherwise by physician.   You may return to sedentary work or school 1-2 days after surgery if feeling well.    You will not be needing a knee brace      ICE THERAPY   Icing is very important in the initial post-operative period and should begin immediately after surgery.   Use ice packs / bags for 30-45 minutes every 2 hours daily until there is no swelling and pain is relieved - remember to keep leg elevated to level of chest while icing. Care should be taken with icing to avoid frostbite to the skin.       EXERCISES   Begin exercises 24 hours after surgery, including knee extension, quad sets, straight leg raises, and active flexion / extension unless otherwise instructed. (Please see handout)  Discomfort and knee stiffness are normal for a few days following surgery. It is safe to bend your knee in a non-weightbearing position when performing exercises unless otherwise instructed.   Complete exercises 3-4 times daily until your first post-operative visit - your motion goals are to have complete extension (straightening) and 90 degrees of flexion (bending) at your first post- operative appointment unless otherwise instructed.   Perform ankle pumps continuously throughout the day to reduce the risk of developing a blood clot in your calf.   Formal physical therapy (PT) typically begins as soon as possible, ideally 1-3 days after surgery. A prescription and protocol will be  provided prior to surgery.            EMERGENCIES**   Contact Dr. Vasquez’s Team via [employer] or [phone] if any of the following are present:     Painful swelling or numbness (note that some swelling and numbness is normal).   Unrelenting pain.  Fever (over 101° - it is normal to have a low-grade fever for the first day or two following surgery) or chills.   Redness around incisions.   Color change in foot or ankle.   Continuous drainage or bleeding from incision (a small amount of drainage is expected).   Difficulty breathing.   Excessive nausea/vomiting   Severe calf pain.   If you have an emergency after office hours or on the weekend, contact the office at [phone] and you will be connected to our pager service. This will connect you with the Physician on call.   If you have an emergency that requires immediate attention proceed to the nearest emergency room.       FOLLOW-UP CARE/QUESTIONS   Your first post-operative appointment will be 7-10 days following surgery for wound evaluation and to answer any questions you have regarding the procedure.   Typically, the first physical therapy appointment is made for 1-3 days following surgery. This prescription will be communicated prior to surgery.  If you have any further questions please contact Dr. Vasquez’s team directly.    You received a drug called Toradol which is an Anti Inflammatory at: 8:00AM  If you are allowed to take Anti inflammatories:    Do not take any Anti Inflammatory like Motrin, Aleve or Ibuprophen until after: 2:00PM  Please report any suspected allergic reactions or bleeding issues to your doctor

## 2025-01-21 ENCOUNTER — HOSPITAL ENCOUNTER (OUTPATIENT)
Facility: HOSPITAL | Age: 50
Setting detail: HOSPITAL OUTPATIENT SURGERY
Discharge: HOME | End: 2025-01-21
Attending: ORTHOPAEDIC SURGERY | Admitting: ORTHOPAEDIC SURGERY
Payer: COMMERCIAL

## 2025-01-21 ENCOUNTER — ANESTHESIA (OUTPATIENT)
Dept: SURGERY | Facility: HOSPITAL | Age: 50
End: 2025-01-21
Payer: COMMERCIAL

## 2025-01-21 VITALS
TEMPERATURE: 97 F | BODY MASS INDEX: 47.74 KG/M2 | SYSTOLIC BLOOD PRESSURE: 123 MMHG | OXYGEN SATURATION: 98 % | HEART RATE: 90 BPM | WEIGHT: 315 LBS | HEIGHT: 68 IN | RESPIRATION RATE: 16 BRPM | DIASTOLIC BLOOD PRESSURE: 72 MMHG

## 2025-01-21 DIAGNOSIS — S83.231A COMPLEX TEAR OF MEDIAL MENISCUS OF RIGHT KNEE AS CURRENT INJURY, INITIAL ENCOUNTER: Primary | ICD-10-CM

## 2025-01-21 PROCEDURE — 0SBC4ZZ EXCISION OF RIGHT KNEE JOINT, PERCUTANEOUS ENDOSCOPIC APPROACH: ICD-10-PCS | Performed by: ORTHOPAEDIC SURGERY

## 2025-01-21 PROCEDURE — 76942 ECHO GUIDE FOR BIOPSY: CPT | Performed by: STUDENT IN AN ORGANIZED HEALTH CARE EDUCATION/TRAINING PROGRAM

## 2025-01-21 PROCEDURE — 0QBD4ZZ EXCISION OF RIGHT PATELLA, PERCUTANEOUS ENDOSCOPIC APPROACH: ICD-10-PCS | Performed by: ORTHOPAEDIC SURGERY

## 2025-01-21 RX ORDER — METOCLOPRAMIDE HYDROCHLORIDE 5 MG/ML
INJECTION INTRAMUSCULAR; INTRAVENOUS AS NEEDED
Status: DISCONTINUED | OUTPATIENT
Start: 2025-01-21 | End: 2025-01-21 | Stop reason: SURG

## 2025-01-21 RX ORDER — BUPIVACAINE HYDROCHLORIDE AND EPINEPHRINE 5; 5 MG/ML; UG/ML
INJECTION, SOLUTION EPIDURAL; INTRACAUDAL; PERINEURAL AS NEEDED
Status: DISCONTINUED | OUTPATIENT
Start: 2025-01-21 | End: 2025-01-21 | Stop reason: HOSPADM

## 2025-01-21 RX ORDER — MIDAZOLAM HYDROCHLORIDE 1 MG/ML
INJECTION INTRAMUSCULAR; INTRAVENOUS AS NEEDED
Status: DISCONTINUED | OUTPATIENT
Start: 2025-01-21 | End: 2025-01-21 | Stop reason: SURG

## 2025-01-21 RX ORDER — CEFAZOLIN SODIUM IN 0.9 % NACL 3 G/100 ML
INTRAVENOUS SOLUTION, PIGGYBACK (ML) INTRAVENOUS
Status: DISCONTINUED
Start: 2025-01-21 | End: 2025-01-21

## 2025-01-21 RX ORDER — DEXAMETHASONE SODIUM PHOSPHATE 4 MG/ML
VIAL (ML) INJECTION AS NEEDED
Status: DISCONTINUED | OUTPATIENT
Start: 2025-01-21 | End: 2025-01-21 | Stop reason: SURG

## 2025-01-21 RX ORDER — KETOROLAC TROMETHAMINE 30 MG/ML
INJECTION, SOLUTION INTRAMUSCULAR; INTRAVENOUS AS NEEDED
Status: DISCONTINUED | OUTPATIENT
Start: 2025-01-21 | End: 2025-01-21 | Stop reason: SURG

## 2025-01-21 RX ORDER — HYDROMORPHONE HYDROCHLORIDE 1 MG/ML
INJECTION, SOLUTION INTRAMUSCULAR; INTRAVENOUS; SUBCUTANEOUS
Status: COMPLETED
Start: 2025-01-21 | End: 2025-01-21

## 2025-01-21 RX ORDER — NALOXONE HYDROCHLORIDE 0.4 MG/ML
0.08 INJECTION, SOLUTION INTRAMUSCULAR; INTRAVENOUS; SUBCUTANEOUS AS NEEDED
Status: DISCONTINUED | OUTPATIENT
Start: 2025-01-21 | End: 2025-01-21

## 2025-01-21 RX ORDER — HYDROMORPHONE HYDROCHLORIDE 1 MG/ML
0.2 INJECTION, SOLUTION INTRAMUSCULAR; INTRAVENOUS; SUBCUTANEOUS EVERY 5 MIN PRN
Status: DISCONTINUED | OUTPATIENT
Start: 2025-01-21 | End: 2025-01-21

## 2025-01-21 RX ORDER — ONDANSETRON 2 MG/ML
4 INJECTION INTRAMUSCULAR; INTRAVENOUS EVERY 6 HOURS PRN
Status: DISCONTINUED | OUTPATIENT
Start: 2025-01-21 | End: 2025-01-21

## 2025-01-21 RX ORDER — ONDANSETRON 2 MG/ML
INJECTION INTRAMUSCULAR; INTRAVENOUS AS NEEDED
Status: DISCONTINUED | OUTPATIENT
Start: 2025-01-21 | End: 2025-01-21 | Stop reason: SURG

## 2025-01-21 RX ORDER — ACETAMINOPHEN 10 MG/ML
INJECTION, SOLUTION INTRAVENOUS
Status: COMPLETED
Start: 2025-01-21 | End: 2025-01-21

## 2025-01-21 RX ORDER — CEFAZOLIN SODIUM IN 0.9 % NACL 3 G/100 ML
3 INTRAVENOUS SOLUTION, PIGGYBACK (ML) INTRAVENOUS ONCE
Status: COMPLETED | OUTPATIENT
Start: 2025-01-21 | End: 2025-01-21

## 2025-01-21 RX ORDER — SODIUM CHLORIDE, SODIUM LACTATE, POTASSIUM CHLORIDE, CALCIUM CHLORIDE 600; 310; 30; 20 MG/100ML; MG/100ML; MG/100ML; MG/100ML
INJECTION, SOLUTION INTRAVENOUS CONTINUOUS
Status: DISCONTINUED | OUTPATIENT
Start: 2025-01-21 | End: 2025-01-21

## 2025-01-21 RX ORDER — HYDROMORPHONE HYDROCHLORIDE 1 MG/ML
0.4 INJECTION, SOLUTION INTRAMUSCULAR; INTRAVENOUS; SUBCUTANEOUS EVERY 5 MIN PRN
Status: DISCONTINUED | OUTPATIENT
Start: 2025-01-21 | End: 2025-01-21

## 2025-01-21 RX ORDER — KETAMINE HYDROCHLORIDE 50 MG/ML
INJECTION, SOLUTION INTRAMUSCULAR; INTRAVENOUS AS NEEDED
Status: DISCONTINUED | OUTPATIENT
Start: 2025-01-21 | End: 2025-01-21 | Stop reason: SURG

## 2025-01-21 RX ORDER — ROPIVACAINE HYDROCHLORIDE 5 MG/ML
INJECTION, SOLUTION EPIDURAL; INFILTRATION; PERINEURAL AS NEEDED
Status: DISCONTINUED | OUTPATIENT
Start: 2025-01-21 | End: 2025-01-21 | Stop reason: SURG

## 2025-01-21 RX ORDER — HYDROMORPHONE HYDROCHLORIDE 1 MG/ML
0.6 INJECTION, SOLUTION INTRAMUSCULAR; INTRAVENOUS; SUBCUTANEOUS EVERY 5 MIN PRN
Status: DISCONTINUED | OUTPATIENT
Start: 2025-01-21 | End: 2025-01-21

## 2025-01-21 RX ORDER — OXYCODONE HYDROCHLORIDE 5 MG/1
5 TABLET ORAL EVERY 4 HOURS PRN
Status: DISCONTINUED | OUTPATIENT
Start: 2025-01-21 | End: 2025-01-21

## 2025-01-21 RX ORDER — ACETAMINOPHEN 500 MG
1000 TABLET ORAL ONCE
Status: DISCONTINUED | OUTPATIENT
Start: 2025-01-21 | End: 2025-01-21 | Stop reason: HOSPADM

## 2025-01-21 RX ORDER — OXYCODONE HYDROCHLORIDE 5 MG/1
10 TABLET ORAL EVERY 4 HOURS PRN
Status: DISCONTINUED | OUTPATIENT
Start: 2025-01-21 | End: 2025-01-21

## 2025-01-21 RX ORDER — TRANEXAMIC ACID 10 MG/ML
INJECTION, SOLUTION INTRAVENOUS AS NEEDED
Status: DISCONTINUED | OUTPATIENT
Start: 2025-01-21 | End: 2025-01-21 | Stop reason: SURG

## 2025-01-21 RX ORDER — ACETAMINOPHEN 10 MG/ML
1000 INJECTION, SOLUTION INTRAVENOUS ONCE
Status: COMPLETED | OUTPATIENT
Start: 2025-01-21 | End: 2025-01-21

## 2025-01-21 RX ORDER — ROCURONIUM BROMIDE 10 MG/ML
INJECTION, SOLUTION INTRAVENOUS AS NEEDED
Status: DISCONTINUED | OUTPATIENT
Start: 2025-01-21 | End: 2025-01-21 | Stop reason: SURG

## 2025-01-21 RX ORDER — LIDOCAINE HYDROCHLORIDE 10 MG/ML
INJECTION, SOLUTION EPIDURAL; INFILTRATION; INTRACAUDAL; PERINEURAL AS NEEDED
Status: DISCONTINUED | OUTPATIENT
Start: 2025-01-21 | End: 2025-01-21 | Stop reason: SURG

## 2025-01-21 RX ORDER — SCOPOLAMINE 1 MG/3D
1 PATCH, EXTENDED RELEASE TRANSDERMAL ONCE
Status: DISCONTINUED | OUTPATIENT
Start: 2025-01-21 | End: 2025-01-21 | Stop reason: HOSPADM

## 2025-01-21 RX ORDER — OXYCODONE HYDROCHLORIDE 5 MG/1
2.5 TABLET ORAL EVERY 4 HOURS PRN
Status: DISCONTINUED | OUTPATIENT
Start: 2025-01-21 | End: 2025-01-21

## 2025-01-21 RX ADMIN — SODIUM CHLORIDE, SODIUM LACTATE, POTASSIUM CHLORIDE, CALCIUM CHLORIDE: 600; 310; 30; 20 INJECTION, SOLUTION INTRAVENOUS at 08:17:00

## 2025-01-21 RX ADMIN — LIDOCAINE HYDROCHLORIDE 50 MG: 10 INJECTION, SOLUTION EPIDURAL; INFILTRATION; INTRACAUDAL; PERINEURAL at 07:20:00

## 2025-01-21 RX ADMIN — ROCURONIUM BROMIDE 50 MG: 10 INJECTION, SOLUTION INTRAVENOUS at 07:21:00

## 2025-01-21 RX ADMIN — DEXAMETHASONE SODIUM PHOSPHATE 4 MG: 4 MG/ML VIAL (ML) INJECTION at 07:30:00

## 2025-01-21 RX ADMIN — KETAMINE HYDROCHLORIDE 25 MG: 50 INJECTION, SOLUTION INTRAMUSCULAR; INTRAVENOUS at 07:51:00

## 2025-01-21 RX ADMIN — METOCLOPRAMIDE HYDROCHLORIDE 10 MG: 5 INJECTION INTRAMUSCULAR; INTRAVENOUS at 07:30:00

## 2025-01-21 RX ADMIN — KETAMINE HYDROCHLORIDE 25 MG: 50 INJECTION, SOLUTION INTRAMUSCULAR; INTRAVENOUS at 08:12:00

## 2025-01-21 RX ADMIN — KETAMINE HYDROCHLORIDE 50 MG: 50 INJECTION, SOLUTION INTRAMUSCULAR; INTRAVENOUS at 07:30:00

## 2025-01-21 RX ADMIN — KETOROLAC TROMETHAMINE 30 MG: 30 INJECTION, SOLUTION INTRAMUSCULAR; INTRAVENOUS at 07:54:00

## 2025-01-21 RX ADMIN — CEFAZOLIN SODIUM IN 0.9 % NACL 3 G: 3 G/100 ML INTRAVENOUS SOLUTION, PIGGYBACK (ML) INTRAVENOUS at 07:20:00

## 2025-01-21 RX ADMIN — TRANEXAMIC ACID 1000 MG: 10 INJECTION, SOLUTION INTRAVENOUS at 07:25:00

## 2025-01-21 RX ADMIN — ONDANSETRON 4 MG: 2 INJECTION INTRAMUSCULAR; INTRAVENOUS at 07:30:00

## 2025-01-21 RX ADMIN — ROPIVACAINE HYDROCHLORIDE 20 ML: 5 INJECTION, SOLUTION EPIDURAL; INFILTRATION; PERINEURAL at 07:18:00

## 2025-01-21 RX ADMIN — SODIUM CHLORIDE, SODIUM LACTATE, POTASSIUM CHLORIDE, CALCIUM CHLORIDE: 600; 310; 30; 20 INJECTION, SOLUTION INTRAVENOUS at 07:04:00

## 2025-01-21 RX ADMIN — MIDAZOLAM HYDROCHLORIDE 2 MG: 1 INJECTION INTRAMUSCULAR; INTRAVENOUS at 07:15:00

## 2025-01-21 NOTE — ANESTHESIA POSTPROCEDURE EVALUATION
[facility]    Pardeep Sims Patient Status:  Hospital Outpatient Surgery   Age/Gender 49 year old male MRN [id number]   Location [facility] POST ANESTHESIA CARE UNIT Attending Ange Vasquez MD   Hosp Day # 0 PCP Shu Mckeon MD       Anesthesia Post-op Note    Right Knee Arthroscopy Partial Medial Meniscectomy Synovectomy Abrasionplasty    Procedure Summary       Date: 01/21/25 Room / Location:  MAIN OR 11 / [facility] MAIN OR    Anesthesia Start: 0704 Anesthesia Stop: 0817    Procedure: Right Knee Arthroscopy Partial Medial Meniscectomy Synovectomy Abrasionplasty (Right: Knee) Diagnosis:       Complex tear of medial meniscus of right knee as current injury, initial encounter      (Complex tear of medial meniscus of right knee as current injury, initial encounter [S83.231A])    Surgeons: Ange Vasquez MD Anesthesiologist: Lina Lee MD    Anesthesia Type: general ASA Status: 2            Anesthesia Type: general    Vitals Value Taken Time   /90 01/21/25 0830   Temp 98.4 °F (36.9 °C) 01/21/25 0815   Pulse 80 01/21/25 0843   Resp 15 01/21/25 0843   SpO2 98 % 01/21/25 0843   Vitals shown include unfiled device data.        Patient Location: PACU    Anesthesia Type: general    Airway Patency: patent and extubated    Postop Pain Control: adequate    Mental Status: preanesthetic baseline    Nausea/Vomiting: none    Cardiopulmonary/Hydration status: stable euvolemic    Complications: no apparent anesthesia related complications    Postop vital signs: stable    Comments: Report given to PACU nurse, Sara.     Dental Exam: Unchanged from Preop    Patient to be discharged from PACU when criteria met.

## 2025-01-21 NOTE — ANESTHESIA PROCEDURE NOTES
Regional Block    Date/Time: 1/21/2025 7:15 AM    Performed by: Lina Lee MD  Authorized by: Lina Lee MD      General Information and Staff    Start Time:  1/21/2025 7:15 AM  End Time:  1/21/2025 7:20 AM  Anesthesiologist:  Lina Lee MD  Performed by:  Anesthesiologist  Patient Location:  OR    Block Placement: Pre Induction  Site Identification: real time ultrasound guided and image stored and retrievable    Block site/laterality marked before start: site marked  Reason for Block: at surgeon's request and post-op pain management    Preanesthetic Checklist: 2 patient identifers, IV checked, risks and benefits discussed, monitors and equipment checked, pre-op evaluation, timeout performed, anesthesia consent, sterile technique used, no prohibitive neurological deficits and no local skin infection at insertion site      Procedure Details    Patient Position:  Supine  Prep: ChloraPrep    Monitoring:  Cardiac monitor, continuous pulse ox, blood pressure cuff and heart rate  Block Type:  Adductor canal  Laterality:  Right  Injection Technique:  Single-shot    Needle    Needle Type:  Short-bevel and echogenic  Needle Gauge:  21 G  Needle Length:  100 mm  Needle Localization:  Ultrasound guidance  Reason for Ultrasound Use: appropriate spread of the medication was noted in real time and no ultrasound evidence of intravascular and/or intraneural injection            Assessment    Injection Assessment:  Good spread noted, negative resistance, negative aspiration for heme, incremental injection, low pressure, local visualized surrounding nerve on ultrasound and no pain on injection  Paresthesia Pain:  Immediately resolved  Heart Rate Change: No    - Patient tolerated block procedure well without evidence of immediate block related complications.     Medications  1/21/2025 7:15 AM      Additional Comments    Medication:  Ropivacaine 0.5% 20mL

## 2025-01-21 NOTE — OPERATIVE REPORT
OPERATIVE REPORT  ATTENDING SURGEON: SUGAR SOW MD  ASSISTANT(S): Dom Barry (AJ)  STATEMENT OF MEDICAL NECESSITY  The aid of assistant Dom Barry (AJ) was needed for patient positioning, preparation, drape, retraction,  wound closure, dressing application and critical portions of the procedure.   PRELIMINARY DIAGNOSIS:  1.  Right Medial Meniscus Tear  2.  Right Patellofemoral and Medial Femoral Chondromalacia     POSTOPERATIVE DIAGNOSIS:  1.  Right Medial Meniscus Tear  2.  Right Patellofemoral and Medial Femoral Chondromalacia  3.  Right Knee synovitis involving multiple compartments    THE OPERATION:  1.  Right Knee Arthroscopy, Partial Medial Meniscectomy (34915)  2.  Right Knee arthroscopic extensive debridement and synovectomy involving multiple compartments (68270)   3.  Right Knee Arthroscopic Abrasionplasty (64458)      ANESTHESIA: General Endotracheal  ANESTHESIOLOGIST:  MD Rosa  ANTIBIOTICS: Cefazolin 2g within 60 minutes of surgical incision.    IMPLANTS:   None  PATHOLOGY/CULTURES: None  ESTIMATED BLOOD LOSS: Blood Output: 5 mL (1/21/2025  7:53 AM)    DRAINS: None  COMPLICATIONS: No intraoperative nor immediate postoperative complications noted.  COUNTS: All sponge and needle counts were correct at the conclusion of the procedure.  TOURNIQUET TIME: Not Applicable  OPERATIVE FINDINGS:  Evidence of complex medial meniscus tearing involving Zone-A as well as zone B extending into zone C.  Mild to moderate degenerative tearing and displaced flap component into the medial compartment.  Successfully managed with partial medial meniscectomy and excision of approximately 30% of the medial meniscus.  Areas of full-thickness articular cartilage wear along the weightbearing medial femoral condyle and lateral tibial plateau managed with abrasion plasty down to bleeding bone.  Moderate synovitis within the knee including medial, lateral patellofemoral compartments managed with synovectomy to a  stable margin.  Lateral compartment is well-maintained.  Mild chondromalacia in the patella and trochlea managed with debridement to a stable margin.    Indications:  Pardeep is a 49 year old male with history, physical examination, and imaging findings consistent with medial meniscus pathology that has been managed with extensive nonsurgical management.  Physical therapy greater than 3 months, intra-articular corticosteroid and ketorolac injection, activity modification, and anti-inflammatory medications without successful symptomatic resolution.  Operative and nonoperative options were discussed with him in my office and we ultimately agreed that surgery would provide the highest likelihood of symptomatic relief and functional improvement.  The risks and benefits of surgery as well as the postoperative rehabilitation plan were reviewed. He voiced a good understanding of treatment options, risks and benefits, and alternatives to surgery. He was given the opportunity to ask questions, which were all answered to the best of my ability and to his satisfaction. Pardeep wished to proceed.   On the day of surgery, the Pardeep was seen in the preoperative area.  I confirmed his identity by name and birthday. We reviewed and confirmed the surgical consent including laterality.  The surgical site was marked with my initials.  We re-reviewed the risks and benefits of the procedure and I answered all additional questions to his satisfaction.      OPERATIVE REPORT:   Pardeep was taken to the operating room in stable condition.    A clear 1010 drape x 2 was applied to the upper thigh. An adjustable lateral post was utilized. The extremity underwent a prescrub with chlorhexidine and alcohol followed by a chlorhexidine formal preparation.  A preoperative timeout was performed confirming the correct surgical site, procedure, and preoperative imaging was reviewed.      Exam under anesthesia demonstrated a Stable knee with normal range  of motion.      Diagnostic knee arthroscopy was performed with standard anterolateral visualization portal was made utilizing a 15 blade, and an arthroscope was introduced. The medial working portal was established under spinal needle localization and diagnostic arthroscopy was commenced.          Diagnostic arthroscopy revealed:      Suprapatellar No evidence of loose bodies   Patellofemoral Central grade 2 articular cartilage wear in the patella approximately 10 x 10 mm.  Additional central trochlear grade 2 articular cartilage wear slightly diffuse.  Successfully managed with chondroplasty to a stable margin.   Gutters Clear without evidence of loose bodies or osteophytes.   Lateral compartment Grade 1 tibial cartilage  Grade 1 femoral cartilage  Lateral meniscus intact without tearing.   Medial compartment Grade 3 tibial cartilage  Grade 3 femoral cartilage managed with debridement using curved mechanical shaver to a stable margin as well as addressing underlying pathology down to bleeding bone.  Evidence of complex medial meniscus tearing involving Zone-A as well as zone B extending into zone C.  Mild to moderate degenerative tearing and displaced flap component into the medial compartment.  Successfully managed with partial medial meniscectomy and excision of approximately 30% of the medial meniscus.  No pathologic plica   Ligaments ACL Intact.   PCL intact  Popliteus intact    Other Moderate synovitis involving the medial, lateral and patellofemoral compartments managed with synovectomy using curved mechanical 4.0 mm shaver and coablation device.     Within the medial compartment, approximately 30% of the meniscus volume was extracted utilizing a curved 4.0 mm mechanical shaver.  Peripheral fraying was ablated with a Coblation device to prevent further propagation of tearing.  Comprehensive partial meniscectomy was performed.  Abrasion plasty of the overlying medial femoral condyle as well as weightbearing  medial plateau managed with curved mechanical shaver down to bleeding bone.  This involved a large area approximately 15 x 15 mm along the medial femoral condyle as well as approximately 5 x 5 mm along the medial tibial plateau.  Within the patellofemoral compartment, a 4.0 mm curved mechanical shaver was used to debride chondral surfaces of the patella and trochlea to stable margin.    Extensive synovectomy was performed in all 3 compartments with the aid of 4.0 mm curved mechanical shaver and Coblation to ensure hemostasis.  Adequate hemostasis were noted.  Wound closure was performed with buried 3-0 monocryl and overlying Dermabond and steri strips were applied.    At this point in time approximately 30 cc of Marcaine with epinephrine were utilized to provide local anesthesia.    4 x 4 and Tegaderm, Carlton style dressing was applied.  The knee was wrapped in a 6 inch Ace wrap.   The final count prior to closure was correct. The patient tolerated the procedure well without complications.   Pardeep was taken to the recovery room in a stable condition with plan for ambulatory discharge to home. He was provided my postoperative discharge booklet, appropriate home exercises, script for outpatient physical therapy, and a copy of my rehabilitation guidelines.      POST OPERATIVE PLAN:  Activity Precautions: WBAT / ROMAT. To begin physical therapy ASAP.   DVT Prophylaxis: Not indicated as patient is ambulatory.   Follow-up Visit: POD #7-14        Ange Vasquez MD  Knee, Shoulder, & Elbow Surgery / Sports Medicine Specialist  Orthopaedic Surgery  [address] [place] [address] [id number] [place] [address].org  [email or web address]  t: [phone]  o: [phone]  f: [phone]    This note was dictated using Dragon software.  While it was briefly proofread prior to completion, some grammatical, spelling, and word choice errors due to dictation may still occur.

## 2025-01-21 NOTE — SPIRITUAL CARE NOTE
Spiritual Care Visit Note    Patient Name: Pardeep Sims Date of Spiritual Care Visit: 25   : 1975 Primary Dx: <principal problem not specified>       Referred By:      Spiritual Care Taxonomy:    Intended Effects: Promote a sense of peace    Methods: Offer support    Interventions: Acknowledge current situation;Active listening;Ask guided questions;[place]    Visit Type/Summary:     - Spiritual Care: Offered empathic listening and emotional support. Patient asked for prayers prior to surgery.  offered prayers. Patient thanked  for support.  remains available as needed for follow up.    Spiritual Care support can be requested via an Epic consult. For urgent/immediate needs, please contact the On Call  at: Edward: ext [phone]    Pr. Mabel Sparks Mdiv.

## 2025-01-21 NOTE — ANESTHESIA PROCEDURE NOTES
Airway  Date/Time: 1/21/2025 7:24 AM  Urgency: elective      General Information and Staff    Patient location during procedure: OR  Anesthesiologist: Lina Lee MD  Performed: anesthesiologist   Performed by: Lina Lee MD  Authorized by: Lina Lee MD      Indications and Patient Condition  Indications for airway management: anesthesia  Spontaneous Ventilation: absent  Sedation level: deep  Preoxygenated: yes  Patient position: sniffing  Mask difficulty assessment: 2 - vent by mask + OA or adjuvant +/- NMBA    Final Airway Details  Final airway type: endotracheal airway      Successful airway: ETT  Cuffed: yes   Successful intubation technique: Video laryngoscopy  Facilitating devices/methods: intubating stylet  Endotracheal tube insertion site: oral  Blade size: #4  ETT size (mm): 7.5    Cormack-Lehane Classification: grade I - full view of glottis  Placement verified by: capnometry   Cuff volume (mL): 5  Measured from: lips  ETT to lips (cm): 22  Number of attempts at approach: 1  Number of other approaches attempted: 0

## 2025-01-21 NOTE — H&P
The below referenced H&P was reviewed by Ange Vasquez MD, the patient was examined and no significant changes have occurred in the patient's condition since the H&P was performed.  I discussed with the patient and/or legal representative the potential benefits, risks and side effects of this procedure; the likelihood of the patient achieving goals; and potential problems that might occur during recuperation.  I discussed reasonable alternatives to the procedure, including risks, benefits and side effects related to the alternatives and risks related to not receiving this procedure.  We will proceed with procedure as planned.           Orthopaedic Surgery  [address] [place] [id number]  [phone]     PRE SURGICAL - HISTORY AND PHYSICAL EXAMINATION     Name: Pardeep Sims   MRN: [id number]     CC: Right Knee Pain and mechanical symptoms    REFERRED BY: Shu Mckeon MD    HPI:   Pardeep Sims is a very pleasant 49 year old male who presents today for evaluation of Right knee pain and mechanical symptoms and recent completion of MRI demonstrating meniscal pathology.     To summarize: right knee pain onset end of August 2023. At that time, he was on a weighted backpack hike when the knee pain developed. This worsens with bending, and is associated with weakness. MRI of the right knee demonstrates a medial meniscus tear.     We have been managing conservatively with physical therapy and 3 right knee steroid injections on 9/29/23 + 11/20/23 + 10/23/24.     He recently underwent left knee arthroscopy partial medial meniscectomy, extensive debridement synovectomy, abrasion plasty of the medial femoral condyle on November 26, 2024. He is seeking a similar surgical procedure for the right knee.    PMH:   Past Medical History:    High blood pressure    Osteoarthritis    bilateral knee       PAST SURGICAL HX:  Past Surgical History:   Procedure Laterality Date    Cholecystectomy  10 years ago    Other  surgical history Left 11/26/2024    Left Knee Arthroscopy Partial Medial Meniscectomy Synovectomy Abrasionplasty    Vasectomy  10 years ago       FAMILY HX:  History reviewed. No pertinent family history.    ALLERGIES:  Patient has no known allergies.    MEDICATIONS:   Current Outpatient Medications   Medication Sig Dispense Refill    Acetaminophen 500 MG Oral Cap Take 2 capsules (1,000 mg total) by mouth every 6 (six) hours for 14 days. 50 capsule 1    Sennosides-Docusate Sodium 8.6-50 MG Oral Cap Take 1 capsule by mouth daily as needed. 30 capsule 1    ondansetron (ZOFRAN) 4 mg tablet Take 1 tablet by mouth every 8 hours as needed for nausea. 8 tablet 0    Meloxicam 15 MG Oral Tab Take 1 tablet (15 mg total) by mouth daily. 14 tablet 1       ROS: A comprehensive 14 point review of systems was performed and was negative aside from the aforementioned per history of present illness.    SOCIAL HX:  Social History     Tobacco Use    Smoking status: Never    Smokeless tobacco: Never   Substance Use Topics    Alcohol use: Not Currently       PE:   Vitals:    01/03/25 1132 01/21/25 0559   BP:  112/85   BP Location:  Right arm   Pulse:  78   Resp:  16   Temp:  98.1 °F (36.7 °C)   TempSrc:  Oral   SpO2:  98%   Weight: 300 lb (136.1 kg) (!) 316 lb (143.3 kg)   Height: 5' 8\" (1.727 m)      Estimated body mass index is 48.05 kg/m² as calculated from the following:    Height as of this encounter: 5' 8\" (1.727 m).    Weight as of this encounter: 316 lb (143.3 kg).    Physical Exam  Constitutional:       Appearance: Normal appearance.   HENT:      Head: Normocephalic and atraumatic.   Eyes:      Extraocular Movements: Extraocular movements intact.   Neck:      Musculoskeletal: Normal range of motion and neck supple.   Cardiovascular:      Pulses: Normal pulses.   Pulmonary:      Effort: Pulmonary effort is normal. No respiratory distress.   Abdominal:      General: There is no distension.   Skin:     General: Skin is warm.       Capillary Refill: Capillary refill takes less than 2 seconds.      Findings: No bruising.   Neurological:      General: No focal deficit present.      Mental Status: Alert.   Psychiatric:         Mood and Affect: Mood normal.     Examination of the right knee demonstrates:     Skin is intact, warm and dry.   Atrophy: mild    Effusion: small    Joint line tenderness: medial  Crepitation: none   Michele: Positive   Patellar mobility: normal without apprehension  J-sign: none    ROM: Extension lacking less than 5 degrees  Flexion 120 degrees  ACL:  Negative Lachman, Negative Pivot Shift   PCL:  Negative Posterior Drawer  Collateral Ligaments: Stable to Varus and Valgus stress at 0 and 30 degrees  Strength: mild weakness   Hip joint: normal pain-free ROM   Gait:  mildly antalgic   Leg length: equal and symmetric  Alignment:  neutral     No obvious peripheral edema noted.   Distal neurovascular exam demonstrates normal perfusion, intact sensation to light touch and full strength.     Examination of the contralateral knee demonstrates:  No significant atrophy, swelling or effusion. Full range of motion. Neurovascularly intact distally.    Radiographic Examination/Diagnostics: MRI of the knee personally viewed, independently interpreted and radiology report was reviewed.    MRI Right Knee, 12/05/2023:   IMPRESSION:   1.Multidirectional tearing and maceration of the body of the medial meniscus which is extruded from the joint space.   2.Medial tibial femoral joint demonstrates severe cartilage degeneration with diffuse full-thickness cartilage thinning.   3.Large joint effusion with multiple intra-articular bodies.   4.High-grade sprain and partial-thickness tearing of the medial collateral ligament.   5.Fluid extending along the myofascial planes of the calf which may be secondary to rupture or leak of popliteal cyst.     IMPRESSION: Pardeep Sims is a 49 year old male with right Medial Meniscus Tear, chondromalacia  and synovitis sustained end of August 2023 after carrying a weighted backpack.  They have failed extensive conservative treatment including Physical therapy greater than 6 weeks, intra-articular knee corticosteroid injection, oral anti-inflammatory medications, and activity modification.     Consequently, they are an appropriate candidate for knee arthroscopy, partial meniscectomy, abrasionplasty and extensive debridement/synovectomy.    PLAN:   I had a lengthy discussion with Pardeep about the diagnosis and options, both surgical and nonsurgical. I have recommended that we proceed with arthroscopic surgical treatment as we agree that this intervention will likely offer the best opportunity for symptomatic relief and functional recovery. I used the MRI scan and a 3-dimensional knee model to outline his pathology, as well as general surgical principles. We reviewed the risks associated with arthroscopic partial meniscectomy and debridement / synovectomy.  In particular I emphasized that the displaced flap component and degenerative portion of the meniscus would be removed as this is dysvascular.  Simultaneously, I will perform a diagnostic knee arthroscopy of the knee and hope to identify and address any other pathology that may be encountered such as scar tissue, inflammation, cartilage pathology.  In particular we discussed risks that include, a low risk of infection (<1%), potential transient or permanent injury to nerves with superficial numbness, joint stiffness which may require additional physical therapy, persistent pain/lack of symptomatic improvement, need for future operation, wound complications (rare as incisions are very small), deep vein thrombosis (DVT) and pulmonary embolism (PE).   We discussed the proposed rehabilitation timeline as well as expected postoperative restrictions.  In this regard, I emphasized that there will be no weightbearing or range of motion restrictions post operatively.   Crutches will be provided initially for patient comfort and I will aim to have the patient begin physical therapy on postoperative day 1.  The advantage of this is to begin immediate mobility and range of motion to mitigate pain and encourage reduction of inflammation/swelling.  This will ultimately lead to a quicker postsurgical rehabilitation.  For most patients, this requires a total of 4 to 6 weeks of postsurgical physical therapy to attain full functional status after simple knee arthroscopy.  Pardeep voiced a good understanding of treatment options, risks and benefits, postoperative instructions, rehabilitation timeline, and restrictions. He was given the opportunity to ask questions, which were all answered to the best of my ability and to his satisfaction. Pardeep will work with my office to arrange a time for surgery and obtain any medical clearance information necessary. My pre-operative information packet, which details the process and answers many FAQ's will be provided. He was encouraged to call the office with any further questions or concerns.  I spent 40 minutes in preparation to see the patient, counseling/education of relevant pathology, discussing imaging results, ordering post surgical physical therapy intervention, surgical counseling, and care coordination.        FOLLOW-UP:  Post-Operative Visit, POD 6 with Abhishek Soto PA-C.         Ange Vasquez MD  Knee, Shoulder, & Elbow Surgery / Sports Medicine Specialist  Orthopaedic Surgery  [address] [place] [email or web address]  [email or web address]  t: [phone]  o: [phone]  f: [phone]    This note was dictated using Dragon software.  While it was briefly proofread prior to completion, some grammatical, spelling, and word choice errors due to dictation may still occur.

## 2025-01-21 NOTE — ANESTHESIA PREPROCEDURE EVALUATION
PRE-OP EVALUATION    Patient Name: Pardeep Sims    Admit Diagnosis: Complex tear of medial meniscus of right knee as current injury, initial encounter [S83.231A]    Pre-op Diagnosis: Complex tear of medial meniscus of right knee as current injury, initial encounter [S83.231A]    Right Knee Arthroscopy Partial Medial Meniscectomy Synovectomy Abrasionplasty    Anesthesia Procedure: Right Knee Arthroscopy Partial Medial Meniscectomy Synovectomy Abrasionplasty (Right)    Surgeons and Role:     * Ange Vasquez MD - Primary    Pre-op vitals reviewed.  Temp: 98.1 °F (36.7 °C)  Pulse: 78  Resp: 16  BP: 112/85  SpO2: 98 %  Body mass index is 48.05 kg/m².    Current medications reviewed.  Hospital Medications:   acetaminophen (Tylenol Extra Strength) tab 1,000 mg  1,000 mg Oral Once    scopolamine (Transderm-Scop) 1 MG/3DAYS patch 1 patch  1 patch Transdermal Once    lactated ringers infusion   Intravenous Continuous    ceFAZolin (Ancef) 3 g in sodium chloride 0.9% 100mL IVPB premix  3 g Intravenous Once    ceFAZolin in sodium chloride 0.9% (Ancef) 3-0.9 GM/100ML-% IVPB premix           Outpatient Medications:   Prescriptions Prior to Admission[1]    Allergies: Patient has no known allergies.      Anesthesia Evaluation    Patient summary reviewed.    Anesthetic Complications  (-) history of anesthetic complications         GI/Hepatic/Renal    Negative GI/hepatic/renal ROS.                             Cardiovascular                (+) obesity  (+) hypertension                       (-) angina     (-) SHER         Endo/Other    Negative endo/other ROS.                              Pulmonary    Negative pulmonary ROS.                       Neuro/Psych    Negative neuro/psych ROS.                                  Past Surgical History:   Procedure Laterality Date    Cholecystectomy  10 years ago    Other surgical history Left 11/26/2024    Left Knee Arthroscopy Partial Medial Meniscectomy Synovectomy Abrasionplasty     Vasectomy  10 years ago     Social History     Socioeconomic History    Marital status:    Tobacco Use    Smoking status: Never    Smokeless tobacco: Never   Vaping Use    Vaping status: Never Used   Substance and Sexual Activity    Alcohol use: Not Currently    Drug use: Not Currently     Types: Oxycodone     Comment: Addicted to pain medication post operatively with bariatric surgery.    Sexual activity: Yes     Partners: Female   Other Topics Concern     Service No    Weight Concern Yes    Exercise No     History   Drug Use Unknown     Comment: Addicted to pain medication post operatively with bariatric surgery.     Available pre-op labs reviewed.  Lab Results   Component Value Date    WBC 7.9 11/19/2024    RBC 4.08 (L) 11/19/2024    HGB 12.5 (L) 11/19/2024    HCT 36.0 (L) 11/19/2024    MCV 88.2 11/19/2024    MCH 30.6 11/19/2024    MCHC 34.7 11/19/2024    RDW 13.3 11/19/2024    .0 11/19/2024     Lab Results   Component Value Date     01/14/2025    K 4.0 01/14/2025     01/14/2025    CO2 24.0 01/14/2025    BUN 15 11/19/2024    CREATSERUM 0.79 11/19/2024     (H) 11/19/2024    CA 9.3 11/19/2024            Airway      Mallampati: II  Mouth opening: >3 FB  TM distance: > 6 cm  Neck ROM: full Cardiovascular             Dental    Dentition appears grossly intact         Pulmonary    Pulmonary exam normal.                 Other findings              ASA: 2   Plan: general  NPO status verified and patient meets guidelines.  Patient has not taken beta blockers in last 24 hours.  Post-procedure pain management plan discussed with surgeon and patient.  Surgeon requests: regional block  Comment: I spoke with the patient and discussed the risks of general anesthesia, which include allergic reaction, nausea, vomiting, dental injury, sore throat, awareness, hypotension, as well as more serious cardiac and pulmonary complications. The patient understands and consents to receiving general  anesthesia for this procedure.     Since pt is on Suboxone and opioid use will provide minimal pain relief, I explained the option and benefit of adductor canal block to Pardeep J Levi including significant. pain relief following the surgical procedure and decreased need for postoperative opioid use. Realistic expectation for block duration was discussed as well. I also explained possible downsides of peripheral nerve blocks including incomplete coverage, breakthrough pain, and failed block, prolonged nerve blockade leading to temporary prolonged numbness in lower extremity and possible muscle weakness necessitating walking boot and falls precautions. Other exceedingly rare complications such as local anesthetic systemic toxicity (LAST), infection, hematoma formation, and permanent nerve damage at the site of block was also discussed. All questions were answered and patient demonstrated understanding of realistic expectations and risks of peripheral nerve blocks, and wishes to proceed with the procedure as planned. R thigh was marked by me with patient confirmation.      Recommended that in the future, pt meet with his pain physician (Dr Sheriff) pre procedure and discuss pain management plan for postop.     Plan/risks discussed with: patient                Present on Admission:  **None**             [1]   Facility-Administered Medications Prior to Admission   Medication Dose Route Frequency Provider Last Rate Last Admin    [COMPLETED] triamcinolone acetonide (Kenalog-40) 40 MG/ML injection 40 mg  40 mg Intra-articular Once Ange Vasquez MD   40 mg at 10/24/24 1218    [COMPLETED] triamcinolone acetonide (Kenalog-40) 40 MG/ML injection 40 mg  40 mg Intra-articular Once Ange aVsquez MD   40 mg at 10/24/24 1218    [COMPLETED] ketorolac (Toradol) 30 MG/ML injection 30 mg  30 mg Intramuscular Once Ange Vasquez MD   30 mg at 10/24/24 1144     Medications Prior to Admission   Medication Sig Dispense Refill Last  Dose/Taking    [] Tirzepatide-Weight Management (ZEPBOUND) 7.5 MG/0.5ML Subcutaneous Solution Auto-injector Inject 7.5 mg into the skin once a week for 4 doses. 2 mL 0 Taking    Naloxone HCl 4 MG/0.1ML Nasal Liquid 4 mg by Nasal route as needed. If patient remains unresponsive, repeat dose in other nostril 2-5 minutes after first dose. 1 kit 0 Taking As Needed    lisinopril 10 MG Oral Tab Take 1 tablet (10 mg total) by mouth daily. 90 tablet 1 2025    ferrous sulfate 325 (65 FE) MG Oral Tab EC Take 1 tablet (325 mg total) by mouth daily with breakfast.   2025    Cholecalciferol (VITAMIN D3) 1.25 MG (00247 UT) Oral Cap Take by mouth.   2025    buprenorphine-naloxone 8-2 MG Sublingual Film Place 1 Film under the tongue in the morning and 1 Film before bedtime. 4 X daily per Dr Sheriff office 24. (Patient taking differently: Place 1 Film under the tongue in the morning and 1 Film before bedtime. 4 X daily per Dr Sheriff office 24  2MG .)   2025 Morning    Tirzepatide-Weight Management (ZEPBOUND) 10 MG/0.5ML Subcutaneous Solution Auto-injector Inject 10 mg into the skin once a week for 4 doses. 2 mL 0 2025    [] Acetaminophen 500 MG Oral Cap Take 2 capsules (1,000 mg total) by mouth every 6 (six) hours for 14 days. (Patient not taking: Reported on 1/3/2025) 50 capsule 1 Not Taking    [] Tirzepatide-Weight Management (ZEPBOUND) 5 MG/0.5ML Subcutaneous Solution Auto-injector Inject 5 mg into the skin once a week for 4 doses. (Patient not taking: Reported on 1/3/2025) 2 mL 0 Not Taking    ALPRAZolam (XANAX) 0.5 MG Oral Tab Take 1 tablet (0.5 mg total) by mouth 2 (two) times daily as needed for Anxiety (anxiety with MRI.). (Patient not taking: Reported on 1/3/2025) 2 tablet 0 Not Taking

## 2025-01-28 ENCOUNTER — OFFICE VISIT (OUTPATIENT)
Dept: ORTHOPEDICS CLINIC | Facility: CLINIC | Age: 50
End: 2025-01-28
Payer: COMMERCIAL

## 2025-01-28 DIAGNOSIS — Z98.890 S/P ARTHROSCOPY OF KNEE: Primary | ICD-10-CM

## 2025-01-28 PROCEDURE — 99024 POSTOP FOLLOW-UP VISIT: CPT | Performed by: PHYSICIAN ASSISTANT

## 2025-01-28 RX ORDER — CELECOXIB 100 MG/1
100 CAPSULE ORAL 2 TIMES DAILY
Qty: 28 CAPSULE | Refills: 0 | Status: SHIPPED | OUTPATIENT
Start: 2025-01-28 | End: 2025-02-11

## 2025-01-28 NOTE — PROGRESS NOTES
[facility] ORTHOPEDICS  [address] [place] [id number]  [phone]       Name: Pardeep Sims   MRN: [id number]  Date: 1/28/2025     REASON FOR VISIT: First Post-Surgical Visit   Surgery: Right knee arthroscopic partial medial meniscectomy, extensive debridement synovectomy and arthroscopic abrasion plasty on January 21, 2025.    INTERVAL HISTORY:  Pardeep Sims is a 49 year old male who returns after the aforementioned procedure.  The post-operative course has been unremarkable with pain well controlled and overall progress noted.     Physical therapy was started and is progressing well.  The patient denies any calf pain or tenderness, fevers, chills, sweats or signs of active infection. The patient has been compliant with the postoperative protocol, and admits to normal bowel and bladder function. He complains of increased pain and discomfort, 8-9/10.     ROS: ROS    PE:   There were no vitals filed for this visit.  Estimated body mass index is 48.05 kg/m² as calculated from the following:    Height as of 1/3/25: 5' 8\" (1.727 m).    Weight as of 1/21/25: 316 lb (143.3 kg).    Physical Exam  Constitutional:       Appearance: Normal appearance.   HENT:      Head: Normocephalic and atraumatic.   Eyes:      Extraocular Movements: Extraocular movements intact.   Neck:      Musculoskeletal: Normal range of motion and neck supple.   Cardiovascular:      Pulses: Normal pulses.   Pulmonary:      Effort: Pulmonary effort is normal. No respiratory distress.   Abdominal:      General: There is no distension.   Skin:     General: Skin is warm.      Capillary Refill: Capillary refill takes less than 2 seconds.      Findings: No bruising.   Neurological:      General: No focal deficit present.      Mental Status: She is alert.   Psychiatric:         Mood and Affect: Mood normal.     Examination of the right knee demonstrates:     Physical examination the patient is alert and oriented  x3, well-developed, well-nourished, no acute distress.     Tegaderm dressings were removed, and Steri-Strips were maintained and kept in place. 90 of flexion.     Incisional sites are clean dry intact without signs of active pathology.  Calf is soft and nontender to palpation.    The contralateral knee is without limitation in range of motion or strength, no positive provocative maneuvers.       IMPRESSION: Pardeep Sims is a 49 year old male who presents 7 days s/p Right knee arthroscopic partial medial meniscectomy, extensive debridement synovectomy and arthroscopic abrasionplasty on January 21, 2025.    PLAN:   We had a lengthy discussion with the patient regarding the patient's findings consistent with the expected postoperative course. We recommend continuation of physical therapy with rehabilitation efforts focused on strengthening, range of motion, functional ability, and return to baseline activity. The patient can continue to progress per protocol.    We prescribed Celebrex 100mg BID.   All questions were answered appropriately and the patient was in agreement with the treatment plan.       FOLLOW-UP:  Return to clinic in four weeks. No imaging required at next visit.             Abhishek Soto [facility], PA-C Orthopedic Surgery / Sports Medicine Specialist  [facility]  [address] [place] [address] [id number] [place] [email or web address]  [email or web address]  t: [phone]  o: [phone]  f: [phone]    This note was dictated using Dragon software.  While it was briefly proofread prior to completion, some grammatical, spelling, and word choice errors due to dictation may still occur.

## 2025-02-15 ENCOUNTER — TELEMEDICINE (OUTPATIENT)
Dept: FAMILY MEDICINE CLINIC | Facility: CLINIC | Age: 50
End: 2025-02-15
Payer: COMMERCIAL

## 2025-02-15 DIAGNOSIS — R73.01 IFG (IMPAIRED FASTING GLUCOSE): ICD-10-CM

## 2025-02-15 DIAGNOSIS — E78.5 DYSLIPIDEMIA: ICD-10-CM

## 2025-02-15 DIAGNOSIS — I10 PRIMARY HYPERTENSION: ICD-10-CM

## 2025-02-15 DIAGNOSIS — E66.01 MORBID OBESITY (HCC): Primary | ICD-10-CM

## 2025-02-15 PROCEDURE — 98006 SYNCH AUDIO-VIDEO EST MOD 30: CPT | Performed by: STUDENT IN AN ORGANIZED HEALTH CARE EDUCATION/TRAINING PROGRAM

## 2025-02-15 RX ORDER — TIRZEPATIDE 10 MG/.5ML
10 INJECTION, SOLUTION SUBCUTANEOUS WEEKLY
Qty: 2 ML | Refills: 0 | Status: SHIPPED | OUTPATIENT
Start: 2025-02-15 | End: 2025-03-09

## 2025-02-15 NOTE — PROGRESS NOTES
Subjective:      No chief complaint on file.    HISTORY OF PRESENT ILLNESS  HPI  HPI obtained per patient report.  Pardeep Sims is a pleasant 49 year old male presenting virtually for follow-up for weight management.     He is taking Zepbound 10 mg weekly currently. He is tolerating this medication and dose well. He reports that Zepbound has been helping with appetite modulation, and he has been focusing on reducing his carbohydrate intake and increasing his protein intake.     PAST PATIENT HISTORY  Past Medical History:    Dyslipidemia    High blood pressure    IFG (impaired fasting glucose)    Osteoarthritis    bilateral knee     Past Surgical History:   Procedure Laterality Date    Cholecystectomy  10 years ago    Other surgical history Left 11/26/2024    Left Knee Arthroscopy Partial Medial Meniscectomy Synovectomy Abrasionplasty    Other surgical history Right 01/21/2025    Right Knee Arthroscopy Partial Medial Meniscectomy Synovectomy Abrasionplasty    Vasectomy  10 years ago       CURRENT MEDICATIONS  Medications Taking[1]    HEALTH MAINTENANCE  Immunization History   Administered Date(s) Administered    Covid-19 Vaccine Moderna 100 mcg/0.5 ml 02/17/2021, 03/17/2021    Pfizer Covid-19 Vaccine 30mcg/0.3ml 12yrs+ 06/23/2024    TDAP 04/04/2007, 06/23/2024   Deferred Date(s) Deferred    Influenza Vaccine, trivalent (IIV3), PF 0.5mL (16549) 11/16/2024       ALLERGIES AND DRUG REACTIONS  Allergies[2]    No family history on file.  Social History     Socioeconomic History    Marital status:    Tobacco Use    Smoking status: Never    Smokeless tobacco: Never   Vaping Use    Vaping status: Never Used   Substance and Sexual Activity    Alcohol use: Not Currently    Drug use: Not Currently     Types: Oxycodone     Comment: Addicted to pain medication post operatively with bariatric surgery.    Sexual activity: Yes     Partners: Female   Other Topics Concern     Service No    Weight Concern Yes     Exercise No       Review of Systems   All other systems reviewed and are negative.         Objective:      There were no vitals taken for this visit.  There is no height or weight on file to calculate BMI.    Physical Exam  Constitutional:       General: He is not in acute distress.     Appearance: He is not ill-appearing or toxic-appearing.   Pulmonary:      Effort: Pulmonary effort is normal.   Musculoskeletal:      Cervical back: Neck supple.   Neurological:      Mental Status: He is alert and oriented to person, place, and time.   Psychiatric:         Mood and Affect: Mood normal.            Assessment and Plan:      1. Morbid obesity (HCC) (Primary)  -     Zepbound; Inject 10 mg into the skin once a week for 4 doses.  Dispense: 2 mL; Refill: 0  2. Body mass index 45.0-49.9, adult (HCC)  -     Zepbound; Inject 10 mg into the skin once a week for 4 doses.  Dispense: 2 mL; Refill: 0  3. Dyslipidemia  -     Zepbound; Inject 10 mg into the skin once a week for 4 doses.  Dispense: 2 mL; Refill: 0  4. IFG (impaired fasting glucose)  -     Zepbound; Inject 10 mg into the skin once a week for 4 doses.  Dispense: 2 mL; Refill: 0  5. Primary hypertension  -     Zepbound; Inject 10 mg into the skin once a week for 4 doses.  Dispense: 2 mL; Refill: 0    Return in about 1 month (around 3/15/2025) for follow-up.    - per chart review, he started Zepbound in 9/2024, at which time his weight was 321 lbs  - per his home scale, he is now down to 295 lbs  - he is tolerating Zepbound 10 mg well  - a shared decision was made to continue his current dose of Zepbound for another month and reevaluate after this time period  - continue healthy dietary changes and regular exercise       Patient verbalized understanding of assessment and recommendations. All questions and concerns were addressed.  Telehealth appointment with video and audio was scheduled and completed with the patient's informed consent. Telehealth appointment took place  in context of CDC social distancing guidelines during the Covid-19 pandemic.  Electronically signed by Shu Mckeon MD       [1]   Outpatient Medications Marked as Taking for the 2/15/25 encounter (Telemedicine) with Shu Mckeon MD   Medication Sig Dispense Refill    Tirzepatide-Weight Management (ZEPBOUND) 10 MG/0.5ML Subcutaneous Solution Auto-injector Inject 10 mg into the skin once a week for 4 doses. 2 mL 0   [2] No Known Allergies

## 2025-03-12 ENCOUNTER — OFFICE VISIT (OUTPATIENT)
Dept: ORTHOPEDICS CLINIC | Facility: CLINIC | Age: 50
End: 2025-03-12
Payer: COMMERCIAL

## 2025-03-12 DIAGNOSIS — Z98.890 S/P ARTHROSCOPY OF KNEE: Primary | ICD-10-CM

## 2025-03-12 PROCEDURE — 99024 POSTOP FOLLOW-UP VISIT: CPT | Performed by: PHYSICIAN ASSISTANT

## 2025-03-12 RX ORDER — MELOXICAM 15 MG/1
15 TABLET ORAL DAILY
Qty: 30 TABLET | Refills: 0 | Status: SHIPPED | OUTPATIENT
Start: 2025-03-12 | End: 2025-04-11

## 2025-03-12 NOTE — PROGRESS NOTES
[facility] ORTHOPEDICS  [address] [place] [id number]  [phone]       Name: Pardeep Sims   MRN: [id number]  Date: 3/12/2025     REASON FOR VISIT: First Post-Surgical Visit   Surgery:  Right knee arthroscopic partial medial meniscectomy, extensive debridement synovectomy and arthroscopic abrasionplasty on January 21, 2025.     INTERVAL HISTORY:  Pardeep Sims is a 49 year old male who returns after the aforementioned procedure.  The post-operative course has been unremarkable with pain well controlled and overall progress noted.     Physical therapy was started and is progressing well.  He is working with [employer] in PT.   ROS: ROS    PE:   There were no vitals filed for this visit.  Estimated body mass index is 48.05 kg/m² as calculated from the following:    Height as of 1/3/25: 5' 8\" (1.727 m).    Weight as of 1/21/25: 316 lb (143.3 kg).    Physical Exam  Constitutional:       Appearance: Normal appearance.   HENT:      Head: Normocephalic and atraumatic.   Eyes:      Extraocular Movements: Extraocular movements intact.   Neck:      Musculoskeletal: Normal range of motion and neck supple.   Cardiovascular:      Pulses: Normal pulses.   Pulmonary:      Effort: Pulmonary effort is normal. No respiratory distress.   Abdominal:      General: There is no distension.   Skin:     General: Skin is warm.      Capillary Refill: Capillary refill takes less than 2 seconds.      Findings: No bruising.   Neurological:      General: No focal deficit present.      Mental Status: She is alert.   Psychiatric:         Mood and Affect: Mood normal.     Examination of the right knee demonstrates:     Physical examination the patient is alert and oriented x3, well-developed, well-nourished, no acute distress.     90 of flexion, small effusion.     Incisional sites are clean dry intact without signs of active pathology.  Calf is soft and nontender to palpation.    The contralateral knee is  without limitation in range of motion or strength, no positive provocative maneuvers.       IMPRESSION: Pardeep Sims is a 49 year old male who presents 7 weeks s/p  Right knee arthroscopic partial medial meniscectomy, extensive debridement synovectomy and arthroscopic abrasion plasty on January 21, 2025.     PLAN:   We had a lengthy discussion with the patient regarding the patient's findings consistent with the expected postoperative course. We recommend continuation of physical therapy with rehabilitation efforts focused on strengthening, range of motion, functional ability, and return to baseline activity. The patient can continue to progress per protocol.    We prescribed Meloixcam 15mg and discussed his gait, and plans for PT with Demetrius at [facility]. He may require aspiration/steroid injection in the future.     All questions were answered appropriately and the patient was in agreement with the treatment plan.       FOLLOW-UP:  Return to clinic in eight weeks. No imaging required at next visit.             Abhishek Soto [facility] Orthopedic Surgery / Sports Medicine Specialist  [facility]  [address] [place] [email or web address]  [email or web address]  t: [phone]  o: [phone]  f: [phone]    This note was dictated using Dragon software.  While it was briefly proofread prior to completion, some grammatical, spelling, and word choice errors due to dictation may still occur.

## 2025-03-17 DIAGNOSIS — E78.5 DYSLIPIDEMIA: ICD-10-CM

## 2025-03-17 DIAGNOSIS — R73.01 IFG (IMPAIRED FASTING GLUCOSE): ICD-10-CM

## 2025-03-17 DIAGNOSIS — I10 PRIMARY HYPERTENSION: ICD-10-CM

## 2025-03-17 DIAGNOSIS — E66.01 MORBID OBESITY (HCC): ICD-10-CM

## 2025-03-18 NOTE — TELEPHONE ENCOUNTER
Requesting Zepbound 10mg  Last OV: 2/15/25  RTC: 1 month  Last Rx'd 2/15/25 #2mL with 0 refills    Future Appointments   Date Time Provider Department Center   3/20/2025  1:00 PM Shu Mckeon MD [facility] [address] [facility] [address]

## 2025-03-20 ENCOUNTER — TELEMEDICINE (OUTPATIENT)
Dept: FAMILY MEDICINE CLINIC | Facility: CLINIC | Age: 50
End: 2025-03-20
Payer: COMMERCIAL

## 2025-03-20 DIAGNOSIS — E66.01 MORBID OBESITY (HCC): ICD-10-CM

## 2025-03-20 DIAGNOSIS — I10 PRIMARY HYPERTENSION: ICD-10-CM

## 2025-03-20 DIAGNOSIS — E78.5 DYSLIPIDEMIA: ICD-10-CM

## 2025-03-20 DIAGNOSIS — R73.01 IFG (IMPAIRED FASTING GLUCOSE): ICD-10-CM

## 2025-03-20 PROCEDURE — 98006 SYNCH AUDIO-VIDEO EST MOD 30: CPT | Performed by: STUDENT IN AN ORGANIZED HEALTH CARE EDUCATION/TRAINING PROGRAM

## 2025-03-20 RX ORDER — TIRZEPATIDE 10 MG/.5ML
INJECTION, SOLUTION SUBCUTANEOUS
Qty: 2 ML | Refills: 0 | OUTPATIENT
Start: 2025-03-20

## 2025-03-20 RX ORDER — TIRZEPATIDE 12.5 MG/.5ML
12.5 INJECTION, SOLUTION SUBCUTANEOUS WEEKLY
Qty: 2 ML | Refills: 2 | Status: SHIPPED | OUTPATIENT
Start: 2025-03-20

## 2025-03-20 NOTE — PROGRESS NOTES
Subjective:      No chief complaint on file.    HISTORY OF PRESENT ILLNESS  HPI  HPI obtained per patient report.  Pardeep Sims is a pleasant 49 year old male presenting virtually for follow-up for weight management.   He is tolerating zepbound 10 mg well, but he is experiemcing a plateau effect in this weight loss since his LOV.     PAST PATIENT HISTORY  Past Medical History:    Dyslipidemia    High blood pressure    IFG (impaired fasting glucose)    Osteoarthritis    bilateral knee     Past Surgical History:   Procedure Laterality Date    Cholecystectomy  10 years ago    Other surgical history Left 11/26/2024    Left Knee Arthroscopy Partial Medial Meniscectomy Synovectomy Abrasionplasty    Other surgical history Right 01/21/2025    Right Knee Arthroscopy Partial Medial Meniscectomy Synovectomy Abrasionplasty    Vasectomy  10 years ago       CURRENT MEDICATIONS  Medications Taking[1]    HEALTH MAINTENANCE  Immunization History   Administered Date(s) Administered    Covid-19 Vaccine Moderna 100 mcg/0.5 ml 02/17/2021, 03/17/2021    Pfizer Covid-19 Vaccine 30mcg/0.3ml 12yrs+ 06/23/2024    TDAP 04/04/2007, 06/23/2024   Deferred Date(s) Deferred    Influenza Vaccine, trivalent (IIV3), PF 0.5mL (20501) 11/16/2024       ALLERGIES AND DRUG REACTIONS  Allergies[2]    No family history on file.  Social History     Socioeconomic History    Marital status:    Tobacco Use    Smoking status: Never    Smokeless tobacco: Never   Vaping Use    Vaping status: Never Used   Substance and Sexual Activity    Alcohol use: Not Currently    Drug use: Not Currently     Types: Oxycodone     Comment: Addicted to pain medication post operatively with bariatric surgery.    Sexual activity: Yes     Partners: Female   Other Topics Concern     Service No    Weight Concern Yes    Exercise No       Review of Systems   All other systems reviewed and are negative.         Objective:      There were no vitals taken for this  visit.  There is no height or weight on file to calculate BMI.    Physical Exam  Constitutional:       General: He is not in acute distress.     Appearance: He is not ill-appearing or toxic-appearing.   Pulmonary:      Effort: Pulmonary effort is normal.   Musculoskeletal:      Cervical back: Neck supple.   Neurological:      Mental Status: He is alert and oriented to person, place, and time.   Psychiatric:         Mood and Affect: Mood normal.            Assessment and Plan:      1. Body mass index 45.0-49.9, adult (HCC) (Primary)  -     Zepbound; Inject 12.5 mg into the skin once a week.  Dispense: 2 mL; Refill: 2  2. Morbid obesity (HCC)  -     Zepbound; Inject 12.5 mg into the skin once a week.  Dispense: 2 mL; Refill: 2  3. Dyslipidemia  -     Zepbound; Inject 12.5 mg into the skin once a week.  Dispense: 2 mL; Refill: 2  4. IFG (impaired fasting glucose)  -     Zepbound; Inject 12.5 mg into the skin once a week.  Dispense: 2 mL; Refill: 2  5. Primary hypertension  -     Zepbound; Inject 12.5 mg into the skin once a week.  Dispense: 2 mL; Refill: 2    Return in about 3 months (around 6/20/2025) for follow-up.    - per chart review, he started Zepbound in 9/2024, at which time his weight was 321 lbs  - per his home scale, he is now at 301 lbs  - he has reached a plateau at his current zepbound dose of 10 mg weekly, so a shared decision was made to increase his dose to 12.5 mg weekly. We discussed the R/B/A of this dose adjustment  - he was asked to follow-up in 3 months or earlier PRN    Patient verbalized understanding of assessment and recommendations. All questions and concerns were addressed.  Telehealth appointment with video and audio was scheduled and completed with the patient's informed consent. Telehealth appointment took place in context of CDC social distancing guidelines during the Covid-19 pandemic.    Electronically signed by Shu Mckeon MD       [1]   Outpatient Medications Marked as Taking for  the 3/20/25 encounter (Telemedicine) with Shu Mckeon MD   Medication Sig Dispense Refill    Tirzepatide-Weight Management (ZEPBOUND) 12.5 MG/0.5ML Subcutaneous Solution Auto-injector Inject 12.5 mg into the skin once a week. 2 mL 2   [2] No Known Allergies

## 2025-03-21 ENCOUNTER — TELEPHONE (OUTPATIENT)
Dept: FAMILY MEDICINE CLINIC | Facility: CLINIC | Age: 50
End: 2025-03-21

## 2025-05-05 ENCOUNTER — OFFICE VISIT (OUTPATIENT)
Dept: ORTHOPEDICS CLINIC | Facility: CLINIC | Age: 50
End: 2025-05-05
Payer: COMMERCIAL

## 2025-05-05 DIAGNOSIS — Z98.890 S/P ARTHROSCOPY OF KNEE: Primary | ICD-10-CM

## 2025-05-05 PROCEDURE — 99213 OFFICE O/P EST LOW 20 MIN: CPT | Performed by: ORTHOPAEDIC SURGERY

## 2025-05-05 NOTE — PROGRESS NOTES
[facility] ORTHOPEDICS  [address] [place] [address]  [phone]       Name: Pardeep Sims   MRN: [id number]  Date: 5/5/2025     REASON FOR VISIT: Third Post-Surgical Visit   Surgery:  Right knee arthroscopic partial medial meniscectomy, extensive debridement synovectomy and arthroscopic abrasionplasty on January 21, 2025.     INTERVAL HISTORY:  Pardeep Sims is a 49 year old male who returns after the aforementioned procedure.  The post-operative course has been unremarkable with pain well controlled and overall progress noted.     Physical therapy was started and is progressing well.  Overall doing well. No acute issues. 2/10 pain.        PE:   There were no vitals filed for this visit.  Estimated body mass index is 48.05 kg/m² as calculated from the following:    Height as of 1/3/25: 5' 8\" (1.727 m).    Weight as of 1/21/25: 316 lb (143.3 kg).    Physical Exam  Constitutional:       Appearance: Normal appearance.   HENT:      Head: Normocephalic and atraumatic.   Eyes:      Extraocular Movements: Extraocular movements intact.   Neck:      Musculoskeletal: Normal range of motion and neck supple.   Cardiovascular:      Pulses: Normal pulses.   Pulmonary:      Effort: Pulmonary effort is normal. No respiratory distress.   Abdominal:      General: There is no distension.   Skin:     General: Skin is warm.      Capillary Refill: Capillary refill takes less than 2 seconds.      Findings: No bruising.   Neurological:      General: No focal deficit present.      Mental Status: She is alert.   Psychiatric:         Mood and Affect: Mood normal.     Examination of the right knee demonstrates:     Physical examination the patient is alert and oriented x3, well-developed, well-nourished, no acute distress.     Trace effusion, 0-110 degrees with STA.     Incisional sites are clean dry intact without signs of active pathology.  Calf is soft and nontender to palpation.    The  contralateral knee is without limitation in range of motion or strength, no positive provocative maneuvers.       IMPRESSION: Pardeep Smis is a 49 year old male who presents 3.5 months s/p  Right knee arthroscopic partial medial meniscectomy, extensive debridement synovectomy and arthroscopic abrasionplasty on January 21, 2025.     PLAN:   We had a lengthy discussion with the patient regarding the patient's findings consistent with the expected postoperative course. We recommend continuation of physical therapy with rehabilitation efforts focused on strengthening, range of motion, functional ability, and return to baseline activity. The patient can continue to progress per protocol.    All questions were answered appropriately and the patient was in agreement with the treatment plan.       FOLLOW-UP:  Return to clinic on an as needed basis.         Ange Vasquez MD  Knee, Shoulder, & Elbow Surgery / Sports Medicine Specialist  Orthopaedic Surgery  [address] [place] [address].[email or web address]  [email or web address]  t: [phone]  o: [phone]  f: [phone]     This note was dictated using Dragon software.  While it was briefly proofread prior to completion, some grammatical, spelling, and word choice errors due to dictation may still occur.

## 2025-08-25 ENCOUNTER — TELEPHONE (OUTPATIENT)
Dept: FAMILY MEDICINE CLINIC | Facility: CLINIC | Age: 50
End: 2025-08-25

## (undated) DEVICE — ADHESIVE SKIN TOP FOR WND CLSR DERMBND ADV

## (undated) DEVICE — #15 STERILE STAINLESS BLADE: Brand: STERILE STAINLESS BLADES

## (undated) DEVICE — SUT MCRYL 3-0 27IN ABSRB UD 19MM PS-2 3/8

## (undated) DEVICE — SOLUTION IRRIG 3000ML 0.9% NACL FLX CONT

## (undated) DEVICE — STANDARD HYPODERMIC NEEDLE,POLYPROPYLENE HUB: Brand: MONOJECT

## (undated) DEVICE — TUBING PMP L16FT DISP INFLOW

## (undated) DEVICE — SOLUTION RUBBING 4OZ 70% ISO ALC CLR

## (undated) DEVICE — ANTISEPTIC 4OZ 70% ISO ALC

## (undated) DEVICE — 3M™ TEGADERM™ +PAD FILM DRESSING WITH NON-ADHERENT PAD, 3584, 2-3/8 IN X 4 IN (6 CM X 10 CM), 50/CAR, 4 CAR/CS: Brand: 3M™ TEGADERM™

## (undated) DEVICE — GLOVE SUR 7.5 SENSICARE PI PIP CRM PWD F

## (undated) DEVICE — SLEEVE COMPR MD KNEE LEN SGL USE KENDALL SCD

## (undated) DEVICE — 3M™ TEGADERM™ +PAD FILM DRESSING WITH NON-ADHERENT PAD, 3587, 3-1/2 IN X 4-1/8 IN (9 CM X 10.5 CM), 25/CAR, 4 CAR/CS: Brand: 3M™ TEGADERM™

## (undated) DEVICE — SHEET,DRAPE,40X58,STERILE: Brand: MEDLINE

## (undated) DEVICE — 3M™ STERI-STRIP™ REINFORCED ADHESIVE SKIN CLOSURES, R1547, 1/2 IN X 4 IN (12 MM X 100 MM), 6 STRIPS/ENVELOPE: Brand: 3M™ STERI-STRIP™

## (undated) DEVICE — DRAPE STERI 1000 UTIL AD

## (undated) DEVICE — KNEE ARTHROSCOPY CDS: Brand: MEDLINE INDUSTRIES, INC.

## (undated) DEVICE — WAND ARTHSCP 3.75MM TIP 5.25MM 90DEG

## (undated) DEVICE — SUPER TURBOVAC 90 WITH INTEGRATED FINGER SWITCHES IFS: Brand: COBLATION

## (undated) DEVICE — COVER LT HNDL PLAS RIG 2 PER PK

## (undated) DEVICE — GLOVE SUR 7.5 SENSICARE PI PIP GRN PWD F

## (undated) DEVICE — AGGRESSIVE PLUS, ANGLED CUTTER: Brand: FORMULA

## (undated) DEVICE — SPONGE 4X4 10PK

## (undated) DEVICE — MEDI-VAC NON-CONDUCTIVE SUCTION TUBING: Brand: CARDINAL HEALTH

## (undated) DEVICE — COVER,MAYO STAND,STERILE: Brand: MEDLINE

## (undated) NOTE — LETTER
APPOINTMENT TOMORROW 24    OUTSIDE TESTING RESULT REQUEST     IMPORTANT: FOR YOUR IMMEDIATE ATTENTION  Please FAX all test results listed below to: [phone]     Testing already done on or about: 24    * * * * If testing is NOT complete, arrange with patient A.S.A.P. * * * *      Patient Name: Pardeep Sims  Surgery Date: 2024  Medical Record: [id number]  CSN: [id number]  : 1975 - A: 49 y     Sex: male  Surgeon(s):  Ange Vasquez MD  Procedure: Left Knee Arthroscopy Partial Medial Meniscectomy Synovectomy Abrasionplasty  Anesthesia Type: General     Surgeon: Ange Vasquez MD     The following Testing and Time Line are REQUIRED PER ANESTHESIA     EKG READ AND SIGNED WITHIN   90 days  BMP (requires 4 hour fast) within  90 days  Electrolytes within 21 days      Thank You,   Sent by:Carolina

## (undated) NOTE — LETTER
Patient Name: Pardeep Sims DOB-Age / Sex: 1975-A: 49 y  male  Medical Records: [id number] CSN: [id number]      The above patient is taking buprenorphine/naloxone at a total daily dose greater than 8 mg of buprenorphine equivalents and will need your advisement on dosing/tapering the medication prior to and after surgery (a buprenorphine equivalency reference table is included on page 2)    Date of Surgery: 2024  Surgical Procedure: Left Knee Arthroscopy Partial Medial Meniscectomy Synovectomy Abrasionplasty    If you have any specific instructions for javier-operative pain management for this patient please indicate it below and fax it to [phone].     PLEASE CONTACT PATIENT WITH DOSING INSTRUCTIONS    __________________________________________________________________________________________________    __________________________________________________________________________________________________    __________________________________________________________________________________________________    ____________________________________________________                          __________________________________  Signature             Date      Buprenorphine Reference Table with Pre-Operative Taper Instructions  Generic Name,  Formulation Brand Name Available Strengths Equivalent Dose to Buprenorphine   8 mg Critical access hospital Suggested Buprenorphine Taper Instructions for the Day Before and the Day of Surgery:     Buprenorphine-naloxone,   Sublingual tablet Suboxone,  generic 2 mg/0.5 mg  8 mg/2 mg 8 mg/2 mg Currently taking more than one 8 mg tab per day --> Take 8 mg once daily   Buprenorphine-naloxone,  Sublingual film Suboxone,   generic 2 mg/0.5 mg  4 mg/1 mg  8 mg/2 mg  12 mg/3 mg 8 mg/2 mg Currently taking more than two 4 mg films per day --> Take two 4 mg films once daily  Currently taking more than one 8 mg film per day --> Take a single 8 mg film once daily  Currently taking more than one 12 mg film  per day --> Take a single 12 mg film once daily  *Note: if patient is cutting films in half to make a dose not listed above, advise patient not to exceed 8-12 mg once daily*   Buprenorphine-naloxone,  Sublingual tablet Zubsolv 0.7 mg/0.18 mg  1.4 mg/0.36 mg  2.9 mg/0.71 mg  5.7 mg/1.4 mg  8.6 mg/2.1 mg  11.4 mg/2.9 mg  5.7 mg/1.4 mg Currently using more than one 5.7 mg tab per day --> Take 5.7 mg once daily  Currently using more than one 8.6 mg tab per day --> Take 8.6 mg once daily  Currently using more than one 11.4 mg tab per day  --> Take 11.4 mg once daily the day prior to surgery and hold the dose the day of surgery.  PAT: Flag chart for anesthesia review   *Note: if patient is taking a combination of strengths not listed above, advise patient not to exceed 5.7 - 11.4 mg once daily*   Buprenorphine-naloxone,  Buccal film Bunavail 2.1 mg/0.3 mg  4.2 mg/0.7 mg  6.3 mg/1 mg 4.2 mg/0.7 mg Currently taking more than two 2.1 mg films per day --> Take two 2.1 mg films once daily  Currently taking more than one 4.2 mg films per day --> Take one 4.2 mg film once daily  Currently taking more than one 6.3 mg films per day --> Take one 6.3 mg film once daily  *Note: if patient is cutting films in half to make a dose not listed above, advise patient not to exceed 4.2 - 6.3 mg once daily*   Buprenorphine,  Buccal film Belbuca 75 µg up to  300 µg Max dose is less than 8 mg No taper required. Continue prescribed dose.   Buprenorphine,  Transdermal patch Butrans 5 µg up to 20 µg Max dose is less than 8 mg No taper required. Continue prescribed dose.      **For add-on cases or cases where the patient was unable to be reached until the day prior to surgery:  Flag chart for anesthesia review.

## (undated) NOTE — LETTER
24      Orthopedic Surgery   Pre-Operative Clearance Request    Patient Name:   Pardeep Sims             :   1975    Surgeon: Dr. Vasquez             Date of Surgery: 2024    Surgical Procedure: Left Knee Arthroscopy Partial Medial Meniscectomy Synovectomy Abrasionplasty       MUST COMPLETE ALL OF THE FOLLOWING 2-3 WEEKS PRIOR TO YOUR SURGERY TO AVOID CANCELLATION, DUE TO THE RULE THEIR WILL BE NO EXCEPTIONS!      [x]  History and Physical        [x]  Medical  Clearance                             **Please fax test results, H&P, and clearance to [phone] and to P.A[facility]T at [phone]**

## (undated) NOTE — LETTER
OSR/GIRISH Notification    Patient Name: Pardeep Sims  -Age / Sex: 1975-A: 49 y  male  Medical Records: [id number] [facility]: [id number]    Surgeon(s):  Surgeon(s):  Ange Vasquez MD   Procedure: Left Knee Arthroscopy Partial Medial Meniscectomy Synovectomy Abrasionplasty    Anesthesia Type: General Surgery Date: 24    During the pre-operative screening process using the STOP-Bang questionnaire, the patient listed above was identified as being at high risk for obstructive sleep apnea.    If you feel it is appropriate to schedule a sleep study, the [facility] will give priority to patients scheduled for procedures to minimize surgical delays.     If a sleep study is completed prior to surgery, please fax the results/plan of care to Pre-Admission Testing ([phone]) as this information will be utilized in clinical decision-making regarding the patient's upcoming surgery.    Thank you for your assistance in helping us provide a safe, seamless and personal experience for your patient.    Darek Carter MD  , Department of Anesthesia